# Patient Record
Sex: FEMALE | Race: BLACK OR AFRICAN AMERICAN | Employment: UNEMPLOYED | URBAN - METROPOLITAN AREA
[De-identification: names, ages, dates, MRNs, and addresses within clinical notes are randomized per-mention and may not be internally consistent; named-entity substitution may affect disease eponyms.]

---

## 2019-03-15 ENCOUNTER — OFFICE VISIT (OUTPATIENT)
Dept: ORTHOPEDIC SURGERY | Facility: CLINIC | Age: 44
End: 2019-03-15

## 2019-03-15 VITALS
DIASTOLIC BLOOD PRESSURE: 103 MMHG | BODY MASS INDEX: 51.97 KG/M2 | RESPIRATION RATE: 18 BRPM | OXYGEN SATURATION: 100 % | WEIGHT: 282.4 LBS | HEART RATE: 81 BPM | HEIGHT: 62 IN | TEMPERATURE: 96.7 F | SYSTOLIC BLOOD PRESSURE: 169 MMHG

## 2019-03-15 DIAGNOSIS — M54.50 LUMBAR PAIN: ICD-10-CM

## 2019-03-15 DIAGNOSIS — M54.10 RADICULAR PAIN OF RIGHT LOWER EXTREMITY: ICD-10-CM

## 2019-03-15 DIAGNOSIS — M54.16 LUMBAR RADICULOPATHY: Primary | ICD-10-CM

## 2019-03-15 PROBLEM — E66.01 OBESITY, MORBID (HCC): Status: ACTIVE | Noted: 2019-03-15

## 2019-03-15 RX ORDER — BUPIVACAINE HYDROCHLORIDE 2.5 MG/ML
4 INJECTION, SOLUTION EPIDURAL; INFILTRATION; INTRACAUDAL ONCE
Qty: 4 ML | Refills: 0
Start: 2019-03-15 | End: 2019-03-15

## 2019-03-15 RX ORDER — TRIAMCINOLONE ACETONIDE 40 MG/ML
40 INJECTION, SUSPENSION INTRA-ARTICULAR; INTRAMUSCULAR ONCE
Qty: 0.5 ML | Refills: 0
Start: 2019-03-15 | End: 2019-03-15
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
## 2019-03-18 ENCOUNTER — OFFICE VISIT (OUTPATIENT)
Dept: ORTHOPEDIC SURGERY | Facility: CLINIC | Age: 44
End: 2019-03-18

## 2019-03-18 VITALS
DIASTOLIC BLOOD PRESSURE: 83 MMHG | OXYGEN SATURATION: 97 % | HEIGHT: 62 IN | WEIGHT: 280 LBS | HEART RATE: 81 BPM | RESPIRATION RATE: 16 BRPM | BODY MASS INDEX: 51.53 KG/M2 | SYSTOLIC BLOOD PRESSURE: 127 MMHG

## 2019-03-18 DIAGNOSIS — M54.50 LUMBAR PAIN: ICD-10-CM

## 2019-03-18 DIAGNOSIS — M54.10 RADICULAR PAIN OF RIGHT LOWER EXTREMITY: Primary | ICD-10-CM

## 2019-03-18 DIAGNOSIS — M54.16 LUMBAR RADICULOPATHY: ICD-10-CM

## 2019-03-18 NOTE — LETTER
3/18/2019 4:57 PM 
 
Ms. Meredith [address], [place] [id number] Partial work restrictions for injuries to the Thoracic Spine and Lumbar Spine PATIENT'S NAME: Meera Bernabe   DATE: 3/18/2019 SITTING:   The patient can sit no more than 2 hours at one time without breaks LIFTING:   The patient can lift no more than 25 pounds at a time WALKING/STANDING The patient can walk or stand no more than 2 hours at a time KNEELING/SQUATTING The patient can kneel or squat no more than 30 minutes at a time CLIMBING:   The patient can climb no more than 2 flights of stairs at a     time. The patient cannot climb ladders TWISTING/BENDING: The patient can twist or bend no more than twice an hour OVERHEAD WORK:  The patient can participate in activities requiring overhead use of arms or hands on a intermittent basis only, and no more than 30 minutes at a time. These restrictions are in effect for the above named patient From: 3-20-19  TO:4-22-19 PLEASE EXCUSE HER FROM WORK 3-19-19 Sincerely, 
 
 
Benny Moreno MD
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
## 2019-03-18 NOTE — LETTER
3/18/2019 4:52 PM 
 
Ms. Meredith [address], [address] [address] Partial work restrictions for injuries to the Thoracic Spine and Lumbar Spine PATIENT'S NAME: Drea Moreau   DATE: 3/18/2019 SITTING:   The patient can sit no more than 2 hours at one time without breaks LIFTING:   The patient can lift no more than 25 pounds at a time WALKING/STANDING The patient can walk or stand no more than 2 hours at a time KNEELING/SQUATTING The patient can kneel or squat no more than 30 minutes at a time CLIMBING:   The patient can climb no more than 2 flights of stairs at a     time. The patient cannot climb ladders TWISTING/BENDING: The patient can twist or bend no more than twice an hour OVERHEAD WORK:  The patient can participate in activities requiring overhead use of arms or hands on a intermittent basis only, and no more than 30 minutes at a time. These restrictions are in effect for the above named patient From: 3-27-19  TO:4-22-19 Sincerely, 
 
 
 
Olaf Cook MD

## 2019-04-16 ENCOUNTER — OFFICE VISIT (OUTPATIENT)
Dept: ORTHOPEDIC SURGERY | Age: 44
End: 2019-04-16

## 2019-04-16 VITALS
HEIGHT: 62 IN | RESPIRATION RATE: 17 BRPM | HEART RATE: 86 BPM | OXYGEN SATURATION: 98 % | WEIGHT: 280 LBS | SYSTOLIC BLOOD PRESSURE: 134 MMHG | BODY MASS INDEX: 51.53 KG/M2 | DIASTOLIC BLOOD PRESSURE: 88 MMHG | TEMPERATURE: 97.9 F

## 2019-04-16 DIAGNOSIS — M54.50 LUMBAR PAIN: ICD-10-CM

## 2019-04-16 DIAGNOSIS — M54.16 LUMBAR RADICULOPATHY: Primary | ICD-10-CM

## 2019-04-16 RX ORDER — TOPIRAMATE 25 MG/1
TABLET ORAL
Qty: 90 TAB | Refills: 1 | Status: SHIPPED | OUTPATIENT
Start: 2019-04-16 | End: 2020-11-19 | Stop reason: SDUPTHER

## 2019-04-16 RX ORDER — IBUPROFEN 800 MG/1
TABLET ORAL
COMMUNITY
End: 2019-04-16 | Stop reason: SDUPTHER

## 2019-04-16 RX ORDER — METHOCARBAMOL 500 MG/1
500 TABLET, FILM COATED ORAL
Qty: 60 TAB | Refills: 1 | Status: SHIPPED | OUTPATIENT
Start: 2019-04-16 | End: 2020-11-19 | Stop reason: SDUPTHER

## 2019-04-16 RX ORDER — IBUPROFEN 800 MG/1
800 TABLET ORAL
Qty: 60 TAB | Refills: 1 | Status: SHIPPED | OUTPATIENT
Start: 2019-04-16 | End: 2019-08-07 | Stop reason: SDUPTHER

## 2019-04-16 NOTE — LETTER
NOTIFICATION OF RETURN TO WORK / SCHOOL 
 
4/16/2019 11:04 AM 
 
Ms. [address] [facility] [id number] Wilmer Murphy To Whom It May Concern: 
 
Kristan Zamora is under the care of [facility]. She will be out of work from 4/16-4/17 and Fredy Brace return to work on 4/18. If there are questions or concerns please have the patient contact our office. Sincerely, Daisy Dunn NP

## 2019-04-16 NOTE — PATIENT INSTRUCTIONS
Low Back Arthritis: Exercises  Your Care Instructions  Here are some examples of typical rehabilitation exercises for your condition. Start each exercise slowly. Ease off the exercise if you start to have pain. Your doctor or physical therapist will tell you when you can start these exercises and which ones will work best for you. When you are not being active, find a comfortable position for rest. Some people are comfortable on the floor or a medium-firm bed with a small pillow under their head and another under their knees. Some people prefer to lie on their side with a pillow between their knees. Don't stay in one position for too long. Take short walks (10 to 20 minutes) every 2 to 3 hours. Avoid slopes, hills, and stairs until you feel better. Walk only distances you can manage without pain, especially leg pain. How to do the exercises  Pelvic tilt    1. Lie on your back with your knees bent. 2. \"Brace\" your stomach--tighten your muscles by pulling in and imagining your belly button moving toward your spine. 3. Press your lower back into the floor. You should feel your hips and pelvis rock back. 4. Hold for 6 seconds while breathing smoothly. 5. Relax and allow your pelvis and hips to rock forward. 6. Repeat 8 to 12 times. Back stretches    1. Get down on your hands and knees on the floor. 2. Relax your head and allow it to droop. Round your back up toward the ceiling until you feel a nice stretch in your upper, middle, and lower back. Hold this stretch for as long as it feels comfortable, or about 15 to 30 seconds. 3. Return to the starting position with a flat back while you are on your hands and knees. 4. Let your back sway by pressing your stomach toward the floor. Lift your buttocks toward the ceiling. 5. Hold this position for 15 to 30 seconds. 6. Repeat 2 to 4 times. Follow-up care is a key part of your treatment and safety.  Be sure to make and go to all appointments, and call your doctor if you are having problems. It's also a good idea to know your test results and keep a list of the medicines you take. Where can you learn more? Go to http://renetta-ana maria.info/. Enter A423 in the search box to learn more about \"Low Back Arthritis: Exercises. \"  Current as of: September 20, 2018  Content Version: 11.9  © 9709-1244 [employer]. Care instructions adapted under license by [employer] (which disclaims liability or warranty for this information). If you have questions about a medical condition or this instruction, always ask your healthcare professional. [address] any warranty or liability for your use of this information. Acute Low Back Pain: Exercises  Your Care Instructions  Here are some examples of typical rehabilitation exercises for your condition. Start each exercise slowly. Ease off the exercise if you start to have pain. Your doctor or physical therapist will tell you when you can start these exercises and which ones will work best for you. When you are not being active, find a comfortable position for rest. Some people are comfortable on the floor or a medium-firm bed with a small pillow under their head and another under their knees. Some people prefer to lie on their side with a pillow between their knees. Don't stay in one position for too long. Take short walks (10 to 20 minutes) every 2 to 3 hours. Avoid slopes, hills, and stairs until you feel better. Walk only distances you can manage without pain, especially leg pain. How to do the exercises  Back stretches    7. Get down on your hands and knees on the floor. 8. Relax your head and allow it to droop. Round your back up toward the ceiling until you feel a nice stretch in your upper, middle, and lower back. Hold this stretch for as long as it feels comfortable, or about 15 to 30 seconds.   9. Return to the starting position with a flat back while you are on your hands and knees. 10. Let your back sway by pressing your stomach toward the floor. Lift your buttocks toward the ceiling. 11. Hold this position for 15 to 30 seconds. 12. Repeat 2 to 4 times. Follow-up care is a key part of your treatment and safety. Be sure to make and go to all appointments, and call your doctor if you are having problems. It's also a good idea to know your test results and keep a list of the medicines you take. Where can you learn more? Go to http://renetta-ana maria.info/. Enter N939 in the search box to learn more about \"Acute Low Back Pain: Exercises. \"  Current as of: September 20, 2018  Content Version: 11.9  © 7423-9628 [employer]. Care instructions adapted under license by [employer] (which disclaims liability or warranty for this information). If you have questions about a medical condition or this instruction, always ask your healthcare professional. [address] any warranty or liability for your use of this information.

## 2019-04-16 NOTE — PROGRESS NOTES
[address]  [address], [address]  [place], [address] [place] [address] Street  Phone: [phone]  Fax: [phone]    Lasha Edgaru  : 1975  PCP: No primary care provider on file. NEW PATIENT/   PROGRESS NOTE    HISTORY OF PRESENT ILLNESS:  Chief Complaint   Patient presents with    Back Pain     New Patient    Leg Pain     Oleg Garcia is a 41 y.o.  female with history of lumbar pain. She was seen in March with Dr. Huyen Gomez. On 3/7/19. 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 She had burning in her low back. It got better with sitting. She later went to the mall. She reported a shooting pain into her back and right leg pain. She had such a severe shooting pain in her back that she fell over. She had to hop to her car using her son as a crutch. She has been in pain since that episode. Since then, she states her muscle contracts and her right foot turns in when she walks. She is having a burning pain in her back. When she moves she has right anterior thigh pain. She describes an electrifying pain. She has not taken any days off. She has taken warm bath. She has been using Duxeis. She had a MDP a week after and she developed hives. It did not help. Denies bladder/bowel dysfunction, saddle paresthesia, weakness, gait disturbance, or other neurological deficit. Pt at this time desires to continue with current care/proceed with medication evaluation. RADIOGRAPHS:  XR LUMBAR SPINE 3/15/19 [facility]  IMPRESSION:  Two views - no fractures, mild L3-4 space narrowing, small L3-4 osteophytes present, + spondylolisthesis.     XR RIGHT HIP 19 [facility]  Negative for fracture or dislocation of the right hip.  -I have independently reviewed these images during this office visit. -Dr. Feliz Valiente:  AP pelvis and two views - No fractures, no joint space narrowing, no osteophytes present.  Tonnis grade 0     XR LUMB 10/15/18 [facility]  No acute radiographic abnormality involving the lumbar spine. ASSESSMENT  41 y.o. female with lumbar pain. Diagnoses and all orders for this visit:    1. Lumbar radiculopathy  -     topiramate (TOPAMAX) 25 mg tablet; 1 tab nightly  x 5 days, then 2 tabs nightly x 5 day and then 3 tabs nightly  -     ibuprofen (MOTRIN) 800 mg tablet; Take 1 Tab by mouth two (2) times daily as needed for Pain.  -     methocarbamol (ROBAXIN) 500 mg tablet; Take 1 Tab by mouth two (2) times daily as needed for Pain (spasm). 2. Lumbar pain  -     topiramate (TOPAMAX) 25 mg tablet; 1 tab nightly  x 5 days, then 2 tabs nightly x 5 day and then 3 tabs nightly  -     ibuprofen (MOTRIN) 800 mg tablet; Take 1 Tab by mouth two (2) times daily as needed for Pain.  -     methocarbamol (ROBAXIN) 500 mg tablet; Take 1 Tab by mouth two (2) times daily as needed for Pain (spasm). IMPRESSION/PLAN    1) Pt was given information on lumbar exercises. 2) trial of Topamax, Robaxin, Motrin. 3) Start HEP daily, stretches provided. She already has a referral for Lumbar PT. She will start this soon. 4) Ms. Marianela Coleman has a reminder for a \"due or due soon\" health maintenance. I have asked that she contact her primary care provider, No primary care provider on file. , for follow-up on this health maintenance. 5) We have informed patient to notify us for immediate appointment if he has any worsening neurogical symptoms or if an emergency situation presents, then call 911  6) Pt will follow-up in 6 weeks for med and HEP FU. Will consider MRI after conservative treatment. Risks and benefits of ongoing therapy have been reviewed with the patient.  is appropriate.        PAST MEDICAL HISTORY  Past Medical History:   Diagnosis Date    Diabetes ([address] 3/26/2015    H. pylori infection 3/26/2015    Hypertension 2006        MEDICATIONS  Current Outpatient Medications   Medication Sig Dispense Refill    topiramate (TOPAMAX) 25 mg tablet 1 tab nightly  x 5 days, then 2 tabs nightly x 5 day and then 3 tabs nightly 90 Tab 1    ibuprofen (MOTRIN) 800 mg tablet Take 1 Tab by mouth two (2) times daily as needed for Pain. 60 Tab 1    methocarbamol (ROBAXIN) 500 mg tablet Take 1 Tab by mouth two (2) times daily as needed for Pain (spasm). 60 Tab 1    lisinopril-hydrochlorothiazide (PRINZIDE, ZESTORETIC) 20-25 mg per tablet Take 1 Tab by mouth daily. 30 Tab 3       ALLERGIES  Allergies   Allergen Reactions    Pcn [Penicillins] Anaphylaxis       SOCIAL HISTORY    Social History     Socioeconomic History    Marital status: SINGLE     Spouse name: Not on file    Number of children: Not on file    Years of education: Not on file    Highest education level: Not on file   Occupational History    Occupation: Medical Assistant     Comment: [de-identified] [employer]   Social Needs    Financial resource strain: Not on file    Food insecurity:     Worry: Not on file     Inability: Not on file    Transportation needs:     Medical: Not on file     Non-medical: Not on file   Tobacco Use    Smoking status: Never Smoker    Smokeless tobacco: Never Used   Substance and Sexual Activity    Alcohol use:  Yes     Alcohol/week: 1.5 oz     Types: 3 Standard drinks or equivalent per week     Comment: occasionally, was every weekend stopped in March 2014, 1-2 now per month May 2014    Drug use: No    Sexual activity: Yes     Partners: Male   Lifestyle    Physical activity:     Days per week: Not on file     Minutes per session: Not on file    Stress: Not on file   Relationships    Social connections:     Talks on phone: Not on file     Gets together: Not on file     Attends [employer] service: Not on file     Active member of club or organization: Not on file     Attends meetings of clubs or organizations: Not on file     Relationship status: Not on file    Intimate partner violence:     Fear of current or ex partner: Not on file     Emotionally abused: Not on file     Physically abused: Not on file     Forced sexual activity: Not on file   Other Topics Concern    Not on file   Social History Narrative    Not on file       SUBJECTIVE        Pain Scale: 5/10    Pain Assessment  3/18/2019   Location of Pain Back   Pain Location Comment -   Location Modifiers -   Severity of Pain 10   Quality of Pain Burning; Sharp   Duration of Pain Persistent   Frequency of Pain Constant   Aggravating Factors Standing;Walking   Limiting Behavior Yes   Relieving Factors Nothing   Result of Injury No       Accompanied by self. REVIEW OF SYSTEMS  ROS    Constitutional: Negative for fever, chills, or weight change. Respiratory: Negative for cough or shortness of breath. Cardiovascular: Negative for chest pain or palpitations. Gastrointestinal: Negative for acid reflux, change in bowel habits, or constipation. Genitourinary: Negative for incontinence, dysuria and flank pain. Musculoskeletal: Positive for lumbar pain. Skin: Negative for rash. Neurological: Negative for headaches, dizziness, or numbness. Endo/Heme/Allergies: Negative . Psychiatric/Behavioral: Negative. PHYSICAL EXAMINATION  Visit Vitals  /88   Pulse 86   Temp 97.9 °F (36.6 °C)   Resp 17   Ht 5' 2\" (1.575 m)   Wt 280 lb (127 kg)   SpO2 98%   BMI 51.21 kg/m²       Constitutional: Well developed,  well nourished,  awake, alert, and in no acute distress. Neurological:  Sensation to light touch is intact. Psychiatric: Affect and mood are appropriate. Integumentary: No rashes or abrasions noted on exposed areas,  warm, dry and intact. Cardiovascular/Peripheral Vascular:  No peripheral edema is noted. Lymphatic:  No evidence of lymphedema. No cervical lymphadenopathy. SPINE/MUSCULOSKELETAL EXAM    Lumbar spine:  No rash, ecchymosis, or gross obliquity. No fasciculations. No focal atrophy is noted. Range of motion is intact. Mild Tenderness to palpation to lumbar. SI joints non-tender. Trochanters non tender. Musculoskeletal:  No pain with extension, axial loading, or forward flexion. No pain with internal or external rotation of her hips. MOTOR     Hip Flex  Quads Hamstrings Ankle DF EHL Ankle PF   Right +4/5 +4/5 +4/5 +4/5 +4/5 +4/5   Left +4/5 +4/5 +4/5 +4/5 +4/5 +4/5     Straight Leg raise + R, -L    normal gait and station    Ambulation without assistive device. full weight bearing, non-antalgic gait.     Ceci Shape, NP

## 2019-08-07 ENCOUNTER — OFFICE VISIT (OUTPATIENT)
Dept: ORTHOPEDIC SURGERY | Age: 44
End: 2019-08-07

## 2019-08-07 VITALS
TEMPERATURE: 98 F | HEIGHT: 62 IN | WEIGHT: 262 LBS | BODY MASS INDEX: 48.21 KG/M2 | HEART RATE: 91 BPM | DIASTOLIC BLOOD PRESSURE: 90 MMHG | OXYGEN SATURATION: 98 % | SYSTOLIC BLOOD PRESSURE: 134 MMHG

## 2019-08-07 DIAGNOSIS — M54.16 LUMBAR RADICULOPATHY: Primary | ICD-10-CM

## 2019-08-07 DIAGNOSIS — M54.50 LUMBAR PAIN: ICD-10-CM

## 2019-08-07 DIAGNOSIS — M54.10 RADICULAR PAIN OF RIGHT LOWER EXTREMITY: ICD-10-CM

## 2019-08-07 RX ORDER — AMLODIPINE BESYLATE AND ATORVASTATIN CALCIUM 10; 10 MG/1; MG/1
1 TABLET, FILM COATED ORAL DAILY
COMMUNITY
End: 2021-02-25

## 2019-08-07 RX ORDER — IBUPROFEN 800 MG/1
800 TABLET ORAL
Qty: 60 TAB | Refills: 1 | Status: SHIPPED | OUTPATIENT
Start: 2019-08-07 | End: 2020-11-19 | Stop reason: SDUPTHER

## 2019-08-07 NOTE — PATIENT INSTRUCTIONS
Back Pain: Care Instructions  Your Care Instructions    Back pain has many possible causes. It is often related to problems with muscles and ligaments of the back. It may also be related to problems with the nerves, discs, or bones of the back. Moving, lifting, standing, sitting, or sleeping in an awkward way can strain the back. Sometimes you don't notice the injury until later. Arthritis is another common cause of back pain. Although it may hurt a lot, back pain usually improves on its own within several weeks. Most people recover in 12 weeks or less. Using good home treatment and being careful not to stress your back can help you feel better sooner. Follow-up care is a key part of your treatment and safety. Be sure to make and go to all appointments, and call your doctor if you are having problems. It's also a good idea to know your test results and keep a list of the medicines you take. How can you care for yourself at home? · Sit or lie in positions that are most comfortable and reduce your pain. Try one of these positions when you lie down:  ? Lie on your back with your knees bent and supported by large pillows. ? Lie on the floor with your legs on the seat of a sofa or chair. ? Lie on your side with your knees and hips bent and a pillow between your legs. ? Lie on your stomach if it does not make pain worse. · Do not sit up in bed, and avoid soft couches and twisted positions. Bed rest can help relieve pain at first, but it delays healing. Avoid bed rest after the first day of back pain. · Change positions every 30 minutes. If you must sit for long periods of time, take breaks from sitting. Get up and walk around, or lie in a comfortable position. · Try using a heating pad on a low or medium setting for 15 to 20 minutes every 2 or 3 hours. Try a warm shower in place of one session with the heating pad. · You can also try an ice pack for 10 to 15 minutes every 2 to 3 hours.  Put a thin cloth between the ice pack and your skin. · Take pain medicines exactly as directed. ? If the doctor gave you a prescription medicine for pain, take it as prescribed. ? If you are not taking a prescription pain medicine, ask your doctor if you can take an over-the-counter medicine. · Take short walks several times a day. You can start with 5 to 10 minutes, 3 or 4 times a day, and work up to longer walks. Walk on level surfaces and avoid hills and stairs until your back is better. · Return to work and other activities as soon as you can. Continued rest without activity is usually not good for your back. · To prevent future back pain, do exercises to stretch and strengthen your back and stomach. Learn how to use good posture, safe lifting techniques, and proper body mechanics. When should you call for help? Call your doctor now or seek immediate medical care if:    · You have new or worsening numbness in your legs.     · You have new or worsening weakness in your legs. (This could make it hard to stand up.)     · You lose control of your bladder or bowels.    Watch closely for changes in your health, and be sure to contact your doctor if:    · You have a fever, lose weight, or don't feel well.     · You do not get better as expected. Where can you learn more? Go to http://renetta-ana maria.info/. Enter A165 in the search box to learn more about \"Back Pain: Care Instructions. \"  Current as of: September 20, 2018  Content Version: 12.1  © 7743-8332 [employer]. Care instructions adapted under license by [employer] (which disclaims liability or warranty for this information). If you have questions about a medical condition or this instruction, always ask your healthcare professional. [place] [address] any warranty or liability for your use of this information.

## 2020-11-19 ENCOUNTER — OFFICE VISIT (OUTPATIENT)
Dept: ORTHOPEDIC SURGERY | Age: 45
End: 2020-11-19
Payer: MEDICAID

## 2020-11-19 VITALS
HEART RATE: 81 BPM | TEMPERATURE: 97.8 F | SYSTOLIC BLOOD PRESSURE: 134 MMHG | WEIGHT: 268 LBS | BODY MASS INDEX: 49.02 KG/M2 | RESPIRATION RATE: 15 BRPM | DIASTOLIC BLOOD PRESSURE: 86 MMHG

## 2020-11-19 DIAGNOSIS — M54.50 LUMBAR PAIN: ICD-10-CM

## 2020-11-19 DIAGNOSIS — M54.16 LUMBAR RADICULOPATHY: ICD-10-CM

## 2020-11-19 PROCEDURE — 99213 OFFICE O/P EST LOW 20 MIN: CPT | Performed by: NURSE PRACTITIONER

## 2020-11-19 RX ORDER — IBUPROFEN 800 MG/1
800 TABLET ORAL
Qty: 60 TAB | Refills: 1 | Status: SHIPPED | OUTPATIENT
Start: 2020-11-19

## 2020-11-19 RX ORDER — METFORMIN HYDROCHLORIDE 500 MG/1
500 TABLET ORAL 2 TIMES DAILY WITH MEALS
COMMUNITY
End: 2022-02-10

## 2020-11-19 RX ORDER — METHOCARBAMOL 500 MG/1
500 TABLET, FILM COATED ORAL
Qty: 60 TAB | Refills: 1 | Status: SHIPPED | OUTPATIENT
Start: 2020-11-19 | End: 2022-02-10 | Stop reason: SDUPTHER

## 2020-11-19 RX ORDER — TOPIRAMATE 25 MG/1
TABLET ORAL
Qty: 90 TAB | Refills: 1 | Status: SHIPPED | OUTPATIENT
Start: 2020-11-19 | End: 2021-01-21

## 2020-11-19 NOTE — PATIENT INSTRUCTIONS
Back Stretches: Exercises Introduction Here are some examples of exercises for stretching your back. Start each exercise slowly. Ease off the exercise if you start to have pain. Your doctor or physical therapist will tell you when you can start these exercises and which ones will work best for you. How to do the exercises Overhead stretch 1. Stand comfortably with your feet shoulder-width apart. 2. Looking straight ahead, raise both arms over your head and reach toward the ceiling. Do not allow your head to tilt back. 3. Hold for 15 to 30 seconds, then lower your arms to your sides. 4. Repeat 2 to 4 times. Side stretch 1. Stand comfortably with your feet shoulder-width apart. 2. Raise one arm over your head, and then lean to the other side. 3. Slide your hand down your leg as you let the weight of your arm gently stretch your side muscles. Hold for 15 to 30 seconds. 4. Repeat 2 to 4 times on each side. Press-up 1. Lie on your stomach, supporting your body with your forearms. 2. Press your elbows down into the floor to raise your upper back. As you do this, relax your stomach muscles and allow your back to arch without using your back muscles. As your press up, do not let your hips or pelvis come off the floor. 3. Hold for 15 to 30 seconds, then relax. 4. Repeat 2 to 4 times. Relax and rest  
1. Lie on your back with a rolled towel under your neck and a pillow under your knees. Extend your arms comfortably to your sides. 2. Relax and breathe normally. 3. Remain in this position for about 10 minutes. 4. If you can, do this 2 or 3 times each day. Follow-up care is a key part of your treatment and safety. Be sure to make and go to all appointments, and call your doctor if you are having problems. It's also a good idea to know your test results and keep a list of the medicines you take. Where can you learn more? Go to http://www.[employer].com/ Enter Y766 in the search box to learn more about \"Back Stretches: Exercises. \" Current as of: March 2, 2020               Content Version: 12.6 © 2006-2020 [employer]. Care instructions adapted under license by [employer] (which disclaims liability or warranty for this information). If you have questions about a medical condition or this instruction, always ask your healthcare professional. [address] any warranty or liability for your use of this information. Low Back Pain: Exercises Introduction Here are some examples of exercises for you to try. The exercises may be suggested for a condition or for rehabilitation. Start each exercise slowly. Ease off the exercises if you start to have pain. You will be told when to start these exercises and which ones will work best for you. How to do the exercises Press-up 1. Lie on your stomach, supporting your body with your forearms. 2. Press your elbows down into the floor to raise your upper back. As you do this, relax your stomach muscles and allow your back to arch without using your back muscles. As your press up, do not let your hips or pelvis come off the floor. 3. Hold for 15 to 30 seconds, then relax. 4. Repeat 2 to 4 times. Alternate arm and leg (bird dog) exercise Do this exercise slowly. Try to keep your body straight at all times, and do not let one hip drop lower than the other. 1. Start on the floor, on your hands and knees. 2. Tighten your belly muscles. 3. Raise one leg off the floor, and hold it straight out behind you. Be careful not to let your hip drop down, because that will twist your trunk. 4. Hold for about 6 seconds, then lower your leg and switch to the other leg. 5. Repeat 8 to 12 times on each leg. 6. Over time, work up to holding for 10 to 30 seconds each time.  
7. If you feel stable and secure with your leg raised, try raising the opposite arm straight out in front of you at the same time. Knee-to-chest exercise 1. Lie on your back with your knees bent and your feet flat on the floor. 2. Bring one knee to your chest, keeping the other foot flat on the floor (or keeping the other leg straight, whichever feels better on your lower back). 3. Keep your lower back pressed to the floor. Hold for at least 15 to 30 seconds. 4. Relax, and lower the knee to the starting position. 5. Repeat with the other leg. Repeat 2 to 4 times with each leg. 6. To get more stretch, put your other leg flat on the floor while pulling your knee to your chest.   
Curl-ups 1. Lie on the floor on your back with your knees bent at a 90-degree angle. Your feet should be flat on the floor, about 12 inches from your buttocks. 2. Cross your arms over your chest. If this bothers your neck, try putting your hands behind your neck (not your head), with your elbows spread apart. 3. Slowly tighten your belly muscles and raise your shoulder blades off the floor. 4. Keep your head in line with your body, and do not press your chin to your chest. 
5. Hold this position for 1 or 2 seconds, then slowly lower yourself back down to the floor. 6. Repeat 8 to 12 times. Pelvic tilt exercise 1. Lie on your back with your knees bent. 2. \"Brace\" your stomach. This means to tighten your muscles by pulling in and imagining your belly button moving toward your spine. You should feel like your back is pressing to the floor and your hips and pelvis are rocking back. 3. Hold for about 6 seconds while you breathe smoothly. 4. Repeat 8 to 12 times. Heel dig bridging 1. Lie on your back with both knees bent and your ankles bent so that only your heels are digging into the floor. Your knees should be bent about 90 degrees.  
2. Then push your heels into the floor, squeeze your buttocks, and lift your hips off the floor until your shoulders, hips, and knees are all in a straight line. 3. Hold for about 6 seconds as you continue to breathe normally, and then slowly lower your hips back down to the floor and rest for up to 10 seconds. 4. Do 8 to 12 repetitions. Hamstring stretch in doorway 1. Lie on your back in a doorway, with one leg through the open door. 2. Slide your leg up the wall to straighten your knee. You should feel a gentle stretch down the back of your leg. 3. Hold the stretch for at least 15 to 30 seconds. Do not arch your back, point your toes, or bend either knee. Keep one heel touching the floor and the other heel touching the wall. 4. Repeat with your other leg. 5. Do 2 to 4 times for each leg. Hip flexor stretch 1. Kneel on the floor with one knee bent and one leg behind you. Place your forward knee over your foot. Keep your other knee touching the floor. 2. Slowly push your hips forward until you feel a stretch in the upper thigh of your rear leg. 3. Hold the stretch for at least 15 to 30 seconds. Repeat with your other leg. 4. Do 2 to 4 times on each side. Wall sit 1. Stand with your back 10 to 12 inches away from a wall. 2. Lean into the wall until your back is flat against it. 3. Slowly slide down until your knees are slightly bent, pressing your lower back into the wall. 4. Hold for about 6 seconds, then slide back up the wall. 5. Repeat 8 to 12 times. Follow-up care is a key part of your treatment and safety. Be sure to make and go to all appointments, and call your doctor if you are having problems. It's also a good idea to know your test results and keep a list of the medicines you take. Where can you learn more? Go to http://www.[employer].com/ Enter Z324 in the search box to learn more about \"Low Back Pain: Exercises. \" Current as of: March 2, 2020               Content Version: 12.6 © 6664-7831 [employer].   
Care instructions adapted under license by [employer] (which disclaims liability or warranty for this information). If you have questions about a medical condition or this instruction, always ask your healthcare professional. [address] any warranty or liability for your use of this information.

## 2020-11-19 NOTE — PROGRESS NOTES
[address]  [address], [address]  [facility], [address]  Phone: [phone]  Fax: [phone]    Debbi Mclaughlin  : 1975  PCP: Eleanor Morgan MD    PROGRESS NOTE    HISTORY OF PRESENT ILLNESS:  Chief Complaint   Patient presents with    Back Pain     fu    Atilio Wall is a 43 y.o.  female with history of lumbar pain.  She was seenwith Dr. Kathia Gillis in the past. I saw her over a year ago. 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 She had burning in her low back. It got better with sitting. She later went to the mall. Mindy Miller reported a shooting pain into her back and right leg pain. She had such a severe shooting pain in her back that she fell over.  She had to hop to her car using her son as a crutch. She has been in pain since that episode.   I updated her LMRI, she was to continue Topamax and robaxin. She was also referred to PT. Today, she states she never had the MRI for insurance reasons. She is having the same issues. She is some better as she is not working but she is taking care of her mom. She has to lift and move her. She has low back pain and right anterior thigh pain. She has pain with walking and standing and the most pain when she is bending. She has taken Topamax, Robaxin, motrin and doing a HEP since I last saw her. She would like to update her MRI as she is interested in a spinal injections.  Denies bladder/bowel dysfunction, saddle paresthesia, weakness, gait disturbance, or other neurological deficit.  Pt at this time desires to continue with current care/proceed with medication evaluation.      RADIOGRAPHS:  XR LUMBAR SPINE 3/15/19 [facility]  IMPRESSION:  Two views - no fractures, mild L3-4 space narrowing, small L3-4 osteophytes present, + spondylolisthesis.     XR RIGHT HIP 19 [facility]  Negative for fracture or dislocation of the right hip.  -I have independently reviewed these images during this office visit. -Dr. Pernell Sesay:  AP pelvis and two views - No fractures, no joint space narrowing, no osteophytes present. Tonnis grade 0     XR LUMB 10/15/18 [facility]  No acute radiographic abnormality involving the lumbar spine.       ASSESSMENT  39 y.o. female with back pain. Diagnoses and all orders for this visit:    1. Lumbar radiculopathy  -     methocarbamoL (Robaxin) 500 mg tablet; Take 1 Tab by mouth two (2) times daily as needed for Pain (spasm). -     MRI LUMB SPINE WO CONT; Future  -     topiramate (Topamax) 25 mg tablet; 1 tab nightly  x 5 days, then 2 tabs nightly x 5 day and then 3 tabs nightly  -     ibuprofen (MOTRIN) 800 mg tablet; Take 1 Tab by mouth two (2) times daily as needed for Pain. 2. Lumbar pain  -     methocarbamoL (Robaxin) 500 mg tablet; Take 1 Tab by mouth two (2) times daily as needed for Pain (spasm). -     MRI LUMB SPINE WO CONT; Future  -     topiramate (Topamax) 25 mg tablet; 1 tab nightly  x 5 days, then 2 tabs nightly x 5 day and then 3 tabs nightly  -     ibuprofen (MOTRIN) 800 mg tablet; Take 1 Tab by mouth two (2) times daily as needed for Pain. IMPRESSION/PLAN    1) Pt was given information on back exercises. 2) Update LMRI, has completed conservative treatment. 3) refill Robaxin, topamax, motrin   4) Ms. Mirian Augustin has a reminder for a \"due or due soon\" health maintenance. I have asked that she contact her primary care provider, Diana Morse MD, for follow-up on this health maintenance. 5) We have informed patient to notify us for immediate appointment if he has any worsening neurogical symptoms or if an emergency situation presents, then call 911  6) Pt will follow-up in 4 weeks for MRI FU. Risks and benefits of ongoing therapy have been reviewed with the patient.  is appropriate. No pain behaviors. Denies thoughts of harming self or others.  Pt has a good risk to benefit ratio which allows the pt to function in a home environment without side effects. PAST MEDICAL HISTORY  Past Medical History:   Diagnosis Date    Diabetes ([address] 3/26/2015    H. pylori infection 3/26/2015    Hypertension 2006        MEDICATIONS  Current Outpatient Medications   Medication Sig Dispense Refill    metFORMIN (GLUCOPHAGE) 500 mg tablet Take  by mouth two (2) times daily (with meals).  methocarbamoL (Robaxin) 500 mg tablet Take 1 Tab by mouth two (2) times daily as needed for Pain (spasm). 60 Tab 1    topiramate (Topamax) 25 mg tablet 1 tab nightly  x 5 days, then 2 tabs nightly x 5 day and then 3 tabs nightly 90 Tab 1    ibuprofen (MOTRIN) 800 mg tablet Take 1 Tab by mouth two (2) times daily as needed for Pain. 60 Tab 1    amLODIPine-atorvastatin (CADUET) 10-10 mg per tablet Take 1 Tab by mouth daily.  lisinopril-hydrochlorothiazide (PRINZIDE, ZESTORETIC) 20-25 mg per tablet Take 1 Tab by mouth daily. 30 Tab 3       ALLERGIES  Allergies   Allergen Reactions    Pcn [Penicillins] Anaphylaxis       SOCIAL HISTORY    Social History     Socioeconomic History    Marital status: SINGLE     Spouse name: Not on file    Number of children: Not on file    Years of education: Not on file    Highest education level: Not on file   Occupational History    Occupation: Medical Assistant     Comment: [id number]   Social Needs    Financial resource strain: Not on file    Food insecurity     Worry: Not on file     Inability: Not on file   [employer] needs     Medical: Not on file     Non-medical: Not on file   Tobacco Use    Smoking status: Never Smoker    Smokeless tobacco: Never Used   Substance and Sexual Activity    Alcohol use:  Yes     Alcohol/week: 2.5 standard drinks     Types: 3 Standard drinks or equivalent per week     Comment: occasionally, was every weekend stopped in March 2014, 1-2 now per month May 2014    Drug use: No    Sexual activity: Yes     Partners: Male   Lifestyle    Physical activity     Days per week: Not on file     Minutes per session: Not on file    Stress: Not on file   Relationships    Social connections     Talks on phone: Not on file     Gets together: Not on file     Attends Bahai service: Not on file     Active member of club or organization: Not on file     Attends meetings of clubs or organizations: Not on file     Relationship status: Not on file    Intimate partner violence     Fear of current or ex partner: Not on file     Emotionally abused: Not on file     Physically abused: Not on file     Forced sexual activity: Not on file   Other Topics Concern    Not on file   Social History Narrative    Not on file       SUBJECTIVE      Pain Scale: 4/10    Pain Assessment  11/19/2020   Location of Pain Back   Pain Location Comment -   Location Modifiers -   Severity of Pain 4   Quality of Pain Dull   Quality of Pain Comment -   Duration of Pain Persistent   Duration of Pain Comment -   Frequency of Pain Constant   Aggravating Factors Standing;Walking   Limiting Behavior Some   Relieving Factors Rest   Result of Injury -       Accompanied by self. REVIEW OF SYSTEMS  ROS    Constitutional: Negative for fever, chills, or weight change. Respiratory: Negative for cough or shortness of breath. Cardiovascular: Negative for chest pain or palpitations. Gastrointestinal: Negative for acid reflux, change in bowel habits, or constipation. Genitourinary: Negative for incontinence, dysuria and flank pain. Musculoskeletal: Positive for back pain. Skin: Negative for rash. Neurological: Negative for headaches, dizziness, or numbness. Endo/Heme/Allergies: Negative . Psychiatric/Behavioral: Negative. PHYSICAL EXAMINATION  Visit Vitals  /86   Pulse 81   Temp 97.8 °F (36.6 °C) (Temporal)   Resp 15   Wt 268 lb (121.6 kg)   BMI 49.02 kg/m²       Constitutional: Well developed,  well nourished,  awake, alert, and in no acute distress.   Neurological: Sensation to light touch is intact. Psychiatric: Affect and mood are appropriate. Integumentary: No rashes or abrasions noted on exposed areas,  warm, dry and intact. Cardiovascular/Peripheral Vascular:  No peripheral edema is noted. Lymphatic:  No evidence of lymphedema. No cervical lymphadenopathy. SPINE/MUSCULOSKELETAL EXAM  Lumbar spine:  No rash, ecchymosis, or gross obliquity. No fasciculations. No focal atrophy is noted. Range of motion is intact. Tenderness to palpation to low back. SI joints non-tender. Trochanters non tender. Musculoskeletal:  No pain with extension, axial loading, or forward flexion. No pain with internal or external rotation of her hips. MOTOR     Hip Flex  Quads Hamstrings Ankle DF EHL Ankle PF   Right +4/5 +4/5 +4/5 +4/5 +4/5 +4/5   Left +4/5 +4/5 +4/5 +4/5 +4/5 +4/5     Straight Leg raise  - bilaterally. normal gait and station    Ambulation without assistive device. full weight bearing, non-antalgic gait.     Xenia Rign NP

## 2020-12-07 ENCOUNTER — HOSPITAL ENCOUNTER (OUTPATIENT)
Age: 45
Discharge: HOME | End: 2020-12-07
Attending: NURSE PRACTITIONER
Payer: COMMERCIAL

## 2020-12-07 PROCEDURE — 72148 MRI LUMBAR SPINE W/O DYE: CPT

## 2020-12-29 ENCOUNTER — HOSPITAL ENCOUNTER (OUTPATIENT)
Dept: LAB | Age: 45
Discharge: HOME | End: 2020-12-29
Payer: COMMERCIAL

## 2020-12-29 LAB
25(OH)D3 SERPL-MCNC: 24 NG/ML (ref 30–100)
ALBUMIN SERPL-MCNC: 3.8 G/DL (ref 3.4–5)
ALBUMIN/GLOB SERPL: 1 {RATIO} (ref 0.8–1.7)
ALP SERPL-CCNC: 110 U/L (ref 45–117)
ALT SERPL-CCNC: 51 U/L (ref 13–56)
ANION GAP SERPL CALC-SCNC: 4 MMOL/L (ref 3–18)
AST SERPL-CCNC: 22 U/L (ref 10–38)
BASOPHILS # BLD: 0 K/UL (ref 0–0.1)
BASOPHILS NFR BLD: 0 % (ref 0–2)
BILIRUB SERPL-MCNC: 0.3 MG/DL (ref 0.2–1)
BUN SERPL-MCNC: 14 MG/DL (ref 7–18)
BUN/CREAT SERPL: 19 (ref 12–20)
CALCIUM SERPL-MCNC: 9.2 MG/DL (ref 8.5–10.1)
CHLORIDE SERPL-SCNC: 104 MMOL/L (ref 100–111)
CHOLEST SERPL-MCNC: 216 MG/DL
CO2 SERPL-SCNC: 30 MMOL/L (ref 21–32)
CREAT SERPL-MCNC: 0.75 MG/DL (ref 0.6–1.3)
CREAT UR-MCNC: 308 MG/DL (ref 30–125)
DIFFERENTIAL METHOD BLD: NORMAL
EOSINOPHIL # BLD: 0.2 K/UL (ref 0–0.4)
EOSINOPHIL NFR BLD: 2 % (ref 0–5)
ERYTHROCYTE [DISTWIDTH] IN BLOOD BY AUTOMATED COUNT: 13.4 % (ref 11.6–14.5)
GLOBULIN SER CALC-MCNC: 3.7 G/DL (ref 2–4)
GLUCOSE SERPL-MCNC: 175 MG/DL (ref 74–99)
HBA1C MFR BLD: 8 % (ref 4.2–5.6)
HCT VFR BLD AUTO: 38.8 % (ref 35–45)
HDLC SERPL-MCNC: 57 MG/DL (ref 40–60)
HDLC SERPL: 3.8 {RATIO} (ref 0–5)
HGB BLD-MCNC: 12.4 G/DL (ref 12–16)
LDLC SERPL CALC-MCNC: 146 MG/DL (ref 0–100)
LIPID PROFILE,FLP: ABNORMAL
LYMPHOCYTES # BLD: 2.5 K/UL (ref 0.9–3.6)
LYMPHOCYTES NFR BLD: 32 % (ref 21–52)
MCH RBC QN AUTO: 26.6 PG (ref 24–34)
MCHC RBC AUTO-ENTMCNC: 32 G/DL (ref 31–37)
MCV RBC AUTO: 83.1 FL (ref 74–97)
MICROALBUMIN UR-MCNC: 2.36 MG/DL (ref 0–3)
MICROALBUMIN/CREAT UR-RTO: 8 MG/G (ref 0–30)
MONOCYTES # BLD: 0.4 K/UL (ref 0.05–1.2)
MONOCYTES NFR BLD: 5 % (ref 3–10)
NEUTS SEG # BLD: 4.8 K/UL (ref 1.8–8)
NEUTS SEG NFR BLD: 61 % (ref 40–73)
PLATELET # BLD AUTO: 294 K/UL (ref 135–420)
PMV BLD AUTO: 11.2 FL (ref 9.2–11.8)
POTASSIUM SERPL-SCNC: 4.4 MMOL/L (ref 3.5–5.5)
PROT SERPL-MCNC: 7.5 G/DL (ref 6.4–8.2)
RBC # BLD AUTO: 4.67 M/UL (ref 4.2–5.3)
SODIUM SERPL-SCNC: 138 MMOL/L (ref 136–145)
T4 SERPL-MCNC: 7.2 UG/DL (ref 4.8–13.9)
TRIGL SERPL-MCNC: 65 MG/DL (ref ?–150)
TSH SERPL DL<=0.05 MIU/L-ACNC: 1.06 UIU/ML (ref 0.36–3.74)
VLDLC SERPL CALC-MCNC: 13 MG/DL
WBC # BLD AUTO: 7.9 K/UL (ref 4.6–13.2)

## 2020-12-29 PROCEDURE — 82306 VITAMIN D 25 HYDROXY: CPT

## 2020-12-29 PROCEDURE — 80061 LIPID PANEL: CPT

## 2020-12-29 PROCEDURE — 82043 UR ALBUMIN QUANTITATIVE: CPT

## 2020-12-29 PROCEDURE — 84436 ASSAY OF TOTAL THYROXINE: CPT

## 2020-12-29 PROCEDURE — 87522 HEPATITIS C REVRS TRNSCRPJ: CPT

## 2020-12-29 PROCEDURE — 36415 COLL VENOUS BLD VENIPUNCTURE: CPT

## 2020-12-29 PROCEDURE — 85025 COMPLETE CBC W/AUTO DIFF WBC: CPT

## 2020-12-29 PROCEDURE — 80053 COMPREHEN METABOLIC PANEL: CPT

## 2020-12-29 PROCEDURE — 83036 HEMOGLOBIN GLYCOSYLATED A1C: CPT

## 2020-12-29 PROCEDURE — 87389 HIV-1 AG W/HIV-1&-2 AB AG IA: CPT

## 2020-12-29 PROCEDURE — 84443 ASSAY THYROID STIM HORMONE: CPT

## 2020-12-30 LAB
HCV GENOTYPE: NORMAL
HCV RNA SERPL NAA+PROBE-ACNC: NORMAL IU/ML
HCV RNA SERPL NAA+PROBE-LOG IU: NORMAL LOG10 IU/ML
HIV 1+2 AB+HIV1 P24 AG SERPL QL IA: NONREACTIVE
HIV12 RESULT COMMENT, HHIVC: NORMAL
TEST INFORMATION, 550045: NORMAL

## 2021-01-21 ENCOUNTER — OFFICE VISIT (OUTPATIENT)
Dept: ORTHOPEDIC SURGERY | Age: 46
End: 2021-01-21
Payer: COMMERCIAL

## 2021-01-21 VITALS
TEMPERATURE: 97.9 F | BODY MASS INDEX: 49.02 KG/M2 | HEART RATE: 81 BPM | SYSTOLIC BLOOD PRESSURE: 142 MMHG | DIASTOLIC BLOOD PRESSURE: 88 MMHG | WEIGHT: 268 LBS

## 2021-01-21 DIAGNOSIS — M54.16 LUMBAR NEURITIS: Primary | ICD-10-CM

## 2021-01-21 DIAGNOSIS — M47.816 SPONDYLOSIS OF LUMBAR REGION WITHOUT MYELOPATHY OR RADICULOPATHY: ICD-10-CM

## 2021-01-21 PROCEDURE — 99213 OFFICE O/P EST LOW 20 MIN: CPT | Performed by: PHYSICAL MEDICINE & REHABILITATION

## 2021-01-21 RX ORDER — TOPIRAMATE 50 MG/1
50 TABLET, FILM COATED ORAL
Qty: 30 TAB | Refills: 2 | Status: SHIPPED | OUTPATIENT
Start: 2021-01-21 | End: 2022-02-10 | Stop reason: SDUPTHER

## 2021-01-21 NOTE — PROGRESS NOTES
Thea Almeida [facility] [address].  [address], [address]  [facility], [address]  Phone: [phone]  Fax: [phone]        Adrián Antunez  : 1975  PCP: Katarina Pantoja MD    PROGRESS NOTE      ASSESSMENT AND PLAN    Diagnoses and all orders for this visit:    1. Lumbar neuritis  -     topiramate (Topamax) 50 mg tablet; Take 1 Tab by mouth nightly.  -     REFERRAL TO PHYSICAL THERAPY    2. Spondylosis of lumbar region without myelopathy or radiculopathy      1. 39 y.o. female with medical low back pain and neuritis without radiculopathy. Her MRI findings are reassuring. 2. Advised to continue HEP  3. Referred to PT  4. Continue Topamax 50 mg QHS  5. Continue Ibuprofen PRN  6. Given information on lumbar stenosis    Follow-up and Dispositions    · Return in 1 month (on 2021). HISTORY OF PRESENT ILLNESS  Atilio Boothe is a 39 y.o. female. Pt was last evaluated 2019 by TJ Frost for lumbar radiuclopathy. This is my first evaluation of the patient. Pt had a L MRI, images reviewed. Pt notes that she hasn't been working since 2020. Reports that now her low back pain comes from days where she stands/walks alot, like walking the dog. Describes pain in the RLE and behind the R knee at night, citing that she felt throbbing over the past 4 days. She expresses concern over it being DVT. Affirms insomnia due to pain. Denies experiencing this pain before. Denies any injury to the R knee. Denies pain in the LLE. Mentions that the leg will give out randomly. States that when the pain first began, there were 3 months where she couldn't walk on the RLE, noting that she had to use cane/crutches.      Pain Assessment  2021   Location of Pain Back;Leg   Pain Location Comment -   Location Modifiers Right   Severity of Pain 0   Quality of Pain (No Data)   Quality of Pain Comment numbness tingling   Duration of Pain -   Duration of Pain Comment -   Frequency of Pain Intermittent   Aggravating Factors Standing   Aggravating Factors Comment laying, night time   Limiting Behavior Some   Relieving Factors Nothing   Result of Injury -       Does pain radiate into extremities: RLE  Does patient have numbness/tingling: No  Does patient have weakness: No   Pt denies saddle paresthesias. Denies persistent fevers, chills, weight changes, neurogenic bowel or bladder symptoms. Treatments patient has tried:  Physical therapy:No  Doing HEP: Unknown  Beneficial medications: Topamax 25 mg 3 tab nightly. Robaxin 500 mg. Ibuprofen. Failed medications: Unknown  Spinal injections: No    Spinal surgery- No.   Spine surgery consult: No.     L MRI 11/2020: disc protrusion on the L at L5-S1, facet changes  L3-4 w/relative stenosis     reviewed. PMHx of DM, HTN, H. pylori. 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 MRI Results (most recent):  Results from Orders Only encounter on 11/19/20   MRI LUMB SPINE WO CONT    Narrative PROCEDURE: MRI OF THE LUMBAR SPINE WITHOUT CONTRAST. CPT CODE: 95314    PROVIDED REASON FOR EXAM:  Lumbar radiculopathy. Lumbar pain. According to the  available office notes patient has RIGHT SIDE radiculopathy, RIGHT anterior  thigh pain. COMPARISONS: No prior lumbar spine MRI available for comparison. Report for  lumbar spine radiographs available in Care Everywhere from 10/15/2018. TECHNIQUE: T1 weighted, T2 FSE, and FSE inversion recovery sagittal images are  supplemented by T2 weighted and T1 weighted axial images. FINDINGS:    Alignment is normal.  Vertebral body heights are normal.  No acute or chronic fractures. Overall the disc heights are maintained. Disc desiccative changes at L3/L4. Marrow signal is not pathologic. Known marrow edema or mass. Conus terminates at the L1/2 level with normal signal.    Correlation of axial and sagittal data through the disc levels:     On sagittal images only there is a minimal disc bulge at T11/T12 without central  canal or foraminal stenosis. The T12/L1 disc level appears normal.    L1/2: Disc And Central Canal: No significant disc pathology or central stenosis. Facets: No significant arthropathy. Right Foramen: No stenosis. Left Foramen: No stenosis. L2/3: Disc And Central Canal: No significant disc pathology or central stenosis. Facets: No significant arthropathy. Right Foramen: No stenosis. Left Foramen: No stenosis. L3/4: Disc And Central Canal: Small focal posterior disc protrusion with  indentation of the ventral epidural fat and slight contact with the CSF space. Minimal circumferential encroachment on the central canal, may be mild central  canal narrowing. No nerve root impingement. Facets: Minimal bilateral facet DJD              Right Foramen: No stenosis. Left Foramen: No stenosis. L4/5:  Disc And Central Canal: No significant disc pathology or central  stenosis. Facets: No significant arthropathy. Right Foramen: No stenosis. Left Foramen: No stenosis. L5/S1: Disc And Central Canal: Posterior central disc protrusion superimposed on  mild diffuse disc bulge. Indentation of the epidural fat. No central spinal  canal stenosis or nerve root impingement. Facets: Mild facet DJD              Right Foramen: No stenosis. Left Foramen: Disc bulge combines with facet spurs to cause focal  moderate to marked stenosis abdomen level of the pedicle. (Reference sagittal  T1, sagittal T2 image 4). Visible Retroperitoneum And Abdomen: Unremarkable. Impression IMPRESSION:    1. L3/L4 small posterior central disc protrusion with indentation of the CSF  space, no focal nerve root impingement. Mild central canal narrowing. Lateral  recess encroachment without nerve root impingement.     2. L5/S1 posterior central disc protrusion and disc bulge without central canal  stenosis or nerve root impingement. There is focal narrowing of the left foramen  at the level of the pedicle secondary to disc bulge and facet spurs. PAST MEDICAL HISTORY   Past Medical History:   Diagnosis Date    Diabetes ([address] 3/26/2015    H. pylori infection 3/26/2015    Hypertension 2006        Past Surgical History:   Procedure Laterality Date    HX GYN          HX TUBAL LIGATION           MEDICATIONS      Current Outpatient Medications   Medication Sig Dispense Refill    topiramate (Topamax) 50 mg tablet Take 1 Tab by mouth nightly. 30 Tab 2    metFORMIN (GLUCOPHAGE) 500 mg tablet Take  by mouth two (2) times daily (with meals).  methocarbamoL (Robaxin) 500 mg tablet Take 1 Tab by mouth two (2) times daily as needed for Pain (spasm). 60 Tab 1    ibuprofen (MOTRIN) 800 mg tablet Take 1 Tab by mouth two (2) times daily as needed for Pain. 60 Tab 1    amLODIPine-atorvastatin (CADUET) 10-10 mg per tablet Take 1 Tab by mouth daily.  lisinopril-hydrochlorothiazide (PRINZIDE, ZESTORETIC) 20-25 mg per tablet Take 1 Tab by mouth daily. 30 Tab 3       Controlled Substance Monitoring:    No flowsheet data found. ALLERGIES    Allergies   Allergen Reactions    Pcn [Penicillins] Anaphylaxis          PHYSICAL EXAMINATION  Visit Vitals  BP (!) 142/88 (BP 1 Location: Left arm, BP Patient Position: Sitting)   Pulse 81   Temp 97.9 °F (36.6 °C) (Temporal)   Wt 268 lb (121.6 kg)   BMI 49.02 kg/m²         Accompanied by self. Constitutional:  Well developed, well nourished, in no acute distress. Psychiatric: Affect and mood are appropriate. Integumentary: No rashes or abrasions noted on exposed areas. Cardiovascular/Peripheral Vascular: No peripheral edema is noted BLE. SPINE/MUSCULOSKELETAL EXAM    Lumbar spine:  No rash, ecchymosis, or gross obliquity. No fasciculations. No focal atrophy is noted. Fingertips to shins.  Good ROM, no DP. Tenderness to palpation L4-5 to the sacrum, B/L sciatic notch, R trochanteric bursa. SI joints non-tender. MOTOR:       Hip Flex  Quads Hamstrings Ankle DF EHL Ankle PF   Right +4/5 +4/5 +4/5 +4/5 +4/5 +4/5   Left +4/5 +4/5 +4/5 +4/5 +4/5 +4/5     Straight Leg raise negative. No difficulty with tandem gait. Intact heel walk. Thigh pain with R hip ROM    Ambulation without assistive device. FWB. Written by Libra Villa, as dictated by Ayana Delaney MD.    I, Dr. Ayana Delaney MD, confirm that all documentation is accurate. Ms. Posadas Presumerasmo may have a reminder for a \"due or due soon\" health maintenance. I have asked that she contact her primary care provider for follow-up on this health maintenance.

## 2021-01-21 NOTE — PATIENT INSTRUCTIONS
Lumbar Spinal Stenosis: Care Instructions Your Care Instructions Stenosis in the spine is a narrowing of the canal that is around the spinal cord and nerve roots in your back. It can happen as part of aging. Sometimes bone and other tissue grow into this canal and press on the nerves that branch out from the spinal cord. This can cause pain, numbness, and weakness. When it happens in the lower part of your back, it is called lumbar spinal stenosis. It can cause problems in the legs, feet, and rear end (buttocks). You may be able to get relief from the symptoms of spinal stenosis by taking pain medicine. Your doctor may suggest physical therapy and exercises to keep your spine strong and flexible. Some people try steroid shots to reduce swelling. If pain and numbness in your legs are still so bad that you cannot do your normal activities, you may need surgery. Follow-up care is a key part of your treatment and safety. Be sure to make and go to all appointments, and call your doctor if you are having problems. It's also a good idea to know your test results and keep a list of the medicines you take. How can you care for yourself at home? · Take an over-the-counter pain medicine. Nonsteroidal anti-inflammatory drugs (NSAIDs) such as ibuprofen or naproxen seem to work best. But if you can't take NSAIDs, you can try acetaminophen. Be safe with medicines. Read and follow all instructions on the label. · Do not take two or more pain medicines at the same time unless the doctor told you to. Many pain medicines have acetaminophen, which is Tylenol. Too much acetaminophen (Tylenol) can be harmful. · Stay at a healthy weight. Being overweight puts extra strain on your spine. · Change positions often when you sit or stand. This can ease pain. It may also reduce pressure on the spinal cord and its nerves. · Avoid doing things that make your symptoms worse. Walking downhill and standing for a long time may cause pain. · Stretch and strengthen your back muscles as your doctor or physical therapist recommends. If your doctor says it is okay to do them, these exercises may help. ? Lie on your back with your knees bent. Gently pull one bent knee to your chest. Put that foot back on the floor, and then pull the other knee to your chest. 
? Do pelvic tilts. Lie on your back with your knees bent. Tighten your stomach muscles. Pull your belly button (navel) in and up toward your ribs. You should feel like your back is pressing to the floor and your hips and pelvis are slightly lifting off the floor. Hold for 6 seconds while breathing smoothly. ? Stand with your back flat against a wall. Slowly slide down until your knees are slightly bent. Hold for 10 seconds, then slide back up the wall. · Remove or change anything in your house that may cause you to fall. Keep walkways clear of clutter, electrical cords, and throw rugs. When should you call for help? Call 911 anytime you think you may need emergency care. For example, call if: 
  · You are unable to move a leg at all. Call your doctor now or seek immediate medical care if: 
  · You have new or worse symptoms in your legs, belly, or buttocks. Symptoms may include: 
? Numbness or tingling. ? Weakness. ? Pain.  
  · You lose bladder or bowel control. Watch closely for changes in your health, and be sure to contact your doctor if: 
  · You have a fever, lose weight, or don't feel well.  
  · You are not getting better as expected. Where can you learn more? Go to http://www.[employer]/ Alex Pavon in the search box to learn more about \"Lumbar Spinal Stenosis: Care Instructions. \" Current as of: March 2, 2020               Content Version: 12.6 © 7323-5990 [employer]. Care instructions adapted under license by [employer] (which disclaims liability or warranty for this information). If you have questions about a medical condition or this instruction, always ask your healthcare professional. [address] any warranty or liability for your use of this information.

## 2021-02-25 ENCOUNTER — OFFICE VISIT (OUTPATIENT)
Dept: ORTHOPEDIC SURGERY | Age: 46
End: 2021-02-25
Payer: COMMERCIAL

## 2021-02-25 VITALS
DIASTOLIC BLOOD PRESSURE: 96 MMHG | TEMPERATURE: 97.7 F | HEIGHT: 62 IN | OXYGEN SATURATION: 94 % | BODY MASS INDEX: 48.95 KG/M2 | HEART RATE: 102 BPM | WEIGHT: 266 LBS | SYSTOLIC BLOOD PRESSURE: 150 MMHG

## 2021-02-25 DIAGNOSIS — M47.816 SPONDYLOSIS OF LUMBAR REGION WITHOUT MYELOPATHY OR RADICULOPATHY: Primary | ICD-10-CM

## 2021-02-25 PROCEDURE — 99213 OFFICE O/P EST LOW 20 MIN: CPT | Performed by: PHYSICAL MEDICINE & REHABILITATION

## 2021-02-25 RX ORDER — AMLODIPINE BESYLATE 10 MG/1
10 TABLET ORAL DAILY
COMMUNITY

## 2021-02-25 RX ORDER — ASPIRIN 325 MG
TABLET, DELAYED RELEASE (ENTERIC COATED) ORAL
COMMUNITY

## 2021-02-25 RX ORDER — HYDROCHLOROTHIAZIDE 12.5 MG/1
TABLET ORAL
COMMUNITY
Start: 2021-01-05

## 2021-02-25 NOTE — PROGRESS NOTES
Thea Almeida [facility] [address].  [address], [address]  [place], [address] [place] [address] Street  Phone: [phone]  Fax: [phone]        Nadir Rawls  : 1975  PCP: Yumiko Stanley MD    PROGRESS NOTE      ASSESSMENT AND PLAN    Diagnoses and all orders for this visit:    1. Spondylosis of lumbar region without myelopathy or radiculopathy      1. 39 y.o. female with resolved sciatica, decreased low back pain. 2. Advised to continue HEP  3. Recommend short course PT for education, preventive strategies. 4. Given information on preventing injury    Follow-up and Dispositions    · Return if symptoms worsen or fail to improve. HISTORY OF PRESENT ILLNESS  Atiloi Combs is a 39 y.o. female. Pt was last evaluated 2021 for lumbar neuritis. Referred to PT. Pt states that she hasn't started PT yet because she had a COVID exposure. Affirms testing negative. Today, she reports that she hasn't been feeling back or leg pain/throbbing after stopping the Metformin. Notes that she restarted it about 2 days ago to test it out, and states she feels Ramiro. \" Pt mentions that she wants to go a endocrinologist instead of her PCP since he won't change her DM medication. Denies taking Topamax or Ibuprofen in a while. Pain Assessment  2021   Location of Pain Back;Leg   Pain Location Comment -   Location Modifiers Right   Severity of Pain -   Quality of Pain Throbbing; Kriss Gianotti; Aching;Burning   Quality of Pain Comment -   Duration of Pain A few hours   Duration of Pain Comment -   Frequency of Pain Intermittent   Aggravating Factors Other (Comment); Squatting;Bending   Aggravating Factors Comment stand too long   Limiting Behavior Yes   Relieving Factors Rest   Result of Injury No       Does pain radiate into extremities: No  Does patient have numbness/tingling: No  Does patient have weakness: No   Pt denies saddle paresthesias.     Denies persistent fevers, chills, weight changes, neurogenic bowel or bladder symptoms.      Treatments patient has tried:  Physical therapy:No  Doing HEP: Unknown  Beneficial medications: Topamax 50 mg QHS. Robaxin 500 mg. Ibuprofen. Failed medications: Unknown  Spinal injections: No     Spinal surgery- No.   Spine surgery consult: No.      L MRI 2020: disc protrusion on the L at L5-S1, facet changes  L3-4 w/relative stenosis      reviewed. PMHx of DM, HTN, H. pylori. 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 PAST MEDICAL HISTORY   Past Medical History:   Diagnosis Date    Diabetes ([address] 3/26/2015    H. pylori infection 3/26/2015    Hypertension         Past Surgical History:   Procedure Laterality Date    HX GYN          HX TUBAL LIGATION           MEDICATIONS      Current Outpatient Medications   Medication Sig Dispense Refill    amLODIPine (NORVASC) 10 mg tablet Take 10 mg by mouth daily.  cholecalciferol (VITAMIN D3) (50,000 UNITS /1250 MCG) capsule cholecalciferol (vitamin D3) 1,250 mcg (50,000 unit) capsule   TAKE 1 CAPSULE BY MOUTH ONCE A WEEK      hydroCHLOROthiazide (HYDRODIURIL) 12.5 mg tablet hydrochlorothiazide 12.5 mg tablet   TAKE 1 TABLET BY MOUTH ONCE DAILY      topiramate (Topamax) 50 mg tablet Take 1 Tab by mouth nightly. 30 Tab 2    metFORMIN (GLUCOPHAGE) 500 mg tablet Take 500 mg by mouth two (2) times daily (with meals).  methocarbamoL (Robaxin) 500 mg tablet Take 1 Tab by mouth two (2) times daily as needed for Pain (spasm). 60 Tab 1    ibuprofen (MOTRIN) 800 mg tablet Take 1 Tab by mouth two (2) times daily as needed for Pain. 60 Tab 1    amLODIPine-atorvastatin (CADUET) 10-10 mg per tablet Take 1 Tab by mouth daily.  lisinopril-hydrochlorothiazide (PRINZIDE, ZESTORETIC) 20-25 mg per tablet Take 1 Tab by mouth daily. 30 Tab 3       Controlled Substance Monitoring:    No flowsheet data found.      ALLERGIES    Allergies   Allergen Reactions    Lisinopril Anaphylaxis    Pcn [Penicillins] Anaphylaxis          PHYSICAL EXAMINATION  Visit Vitals  BP (!) 150/96 (BP 1 Location: Right lower arm, BP Patient Position: Sitting, BP Cuff Size: Large adult)   Pulse (!) 102   Temp 97.7 °F (36.5 °C) (Skin)   Ht 5' 2\" (1.575 m)   Wt 266 lb (120.7 kg)   SpO2 94%   BMI 48.65 kg/m²         Accompanied by self. Constitutional:  Well developed, well nourished, in no acute distress. Psychiatric: Affect and mood are appropriate. Integumentary: No rashes or abrasions noted on exposed areas. Cardiovascular/Peripheral Vascular: No peripheral edema is noted BLE. SPINE/MUSCULOSKELETAL EXAM    Lumbar spine:  No rash, ecchymosis, or gross obliquity. No fasciculations. No focal atrophy is noted. Good ROM, no pain. Non tender to palpation lumbar spine. No tenderness to palpation at the sciatic notch. SI joints non-tender. Trochanters non tender. MOTOR:       Hip Flex  Quads Hamstrings Ankle DF EHL Ankle PF   Right +4/5 +4/5 +4/5 +4/5 +4/5 +4/5   Left +4/5 +4/5 +4/5 +4/5 +4/5 +4/5     Straight Leg raise negative. No difficulty with tandem gait. Intact single leg toe rise and knee bend. Ambulation without assistive device. FWB. Written by Francie Wang, as dictated by Toni Moreland MD.    I, Dr. Toni Moreland MD, confirm that all documentation is accurate. Ms. Leung Guardian may have a reminder for a \"due or due soon\" health maintenance. I have asked that she contact her primary care provider for follow-up on this health maintenance.

## 2021-02-25 NOTE — PROGRESS NOTES
Ankita Simeon presents today for   Chief Complaint   Patient presents with    Back Pain    Leg Pain     right       Is someone accompanying this pt? no    Is the patient using any DME equipment during OV? no    Depression Screening:  3 most recent PHQ Screens 1/21/2021   PHQ Not Done -   Little interest or pleasure in doing things Not at all   Feeling down, depressed, irritable, or hopeless Not at all   Total Score PHQ 2 0       Learning Assessment:  Learning Assessment 3/15/2019   PRIMARY LEARNER Patient   PRIMARY LANGUAGE ENGLISH   LEARNER PREFERENCE PRIMARY DEMONSTRATION   ANSWERED BY Patient   RELATIONSHIP SELF       Coordination of Care:  1. Have you been to the ER, urgent care clinic since your last visit? no  Hospitalized since your last visit? no    2. Have you seen or consulted any other health care providers outside of the [address] [place] [address] since your last visit? Yes, cardiology, sleep medicine Include any pap smears or colon screening.  no

## 2021-02-25 NOTE — PATIENT INSTRUCTIONS
High Blood Pressure: Care Instructions Overview It's normal for blood pressure to go up and down throughout the day. But if it stays up, you have high blood pressure. Another name for high blood pressure is hypertension. Despite what a lot of people think, high blood pressure usually doesn't cause headaches or make you feel dizzy or lightheaded. It usually has no symptoms. But it does increase your risk of stroke, heart attack, and other problems. You and your doctor will talk about your risks of these problems based on your blood pressure. Your doctor will give you a goal for your blood pressure. Your goal will be based on your health and your age. Lifestyle changes, such as eating healthy and being active, are always important to help lower blood pressure. You might also take medicine to reach your blood pressure goal. 
Follow-up care is a key part of your treatment and safety. Be sure to make and go to all appointments, and call your doctor if you are having problems. It's also a good idea to know your test results and keep a list of the medicines you take. How can you care for yourself at home? Medical treatment · If you stop taking your medicine, your blood pressure will go back up. You may take one or more types of medicine to lower your blood pressure. Be safe with medicines. Take your medicine exactly as prescribed. Call your doctor if you think you are having a problem with your medicine. · Talk to your doctor before you start taking aspirin every day. Aspirin can help certain people lower their risk of a heart attack or stroke. But taking aspirin isn't right for everyone, because it can cause serious bleeding. · See your doctor regularly. You may need to see the doctor more often at first or until your blood pressure comes down. · If you are taking blood pressure medicine, talk to your doctor before you take decongestants or anti-inflammatory medicine, such as ibuprofen. Some of these medicines can raise blood pressure. · Learn how to check your blood pressure at home. Lifestyle changes · Stay at a healthy weight. This is especially important if you put on weight around the waist. Losing even 10 pounds can help you lower your blood pressure. · If your doctor recommends it, get more exercise. Walking is a good choice. Bit by bit, increase the amount you walk every day. Try for at least 30 minutes on most days of the week. You also may want to swim, bike, or do other activities. · Avoid or limit alcohol. Talk to your doctor about whether you can drink any alcohol. · Try to limit how much sodium you eat to less than 2,300 milligrams (mg) a day. Your doctor may ask you to try to eat less than 1,500 mg a day. · Eat plenty of fruits (such as bananas and oranges), vegetables, legumes, whole grains, and low-fat dairy products. · Lower the amount of saturated fat in your diet. Saturated fat is found in animal products such as milk, cheese, and meat. Limiting these foods may help you lose weight and also lower your risk for heart disease. · Do not smoke. Smoking increases your risk for heart attack and stroke. If you need help quitting, talk to your doctor about stop-smoking programs and medicines. These can increase your chances of quitting for good. When should you call for help? Call  911 anytime you think you may need emergency care. This may mean having symptoms that suggest that your blood pressure is causing a serious heart or blood vessel problem. Your blood pressure may be over 180/120. For example, call 911 if: 
  · You have symptoms of a heart attack. These may include: 
? Chest pain or pressure, or a strange feeling in the chest. 
? Sweating. ? Shortness of breath. ? Nausea or vomiting. ? Pain, pressure, or a strange feeling in the back, neck, jaw, or upper belly or in one or both shoulders or arms. ? Lightheadedness or sudden weakness. ? A fast or irregular heartbeat.  
  · You have symptoms of a stroke. These may include: 
? Sudden numbness, tingling, weakness, or loss of movement in your face, arm, or leg, especially on only one side of your body. ? Sudden vision changes. ? Sudden trouble speaking. ? Sudden confusion or trouble understanding simple statements. ? Sudden problems with walking or balance. ? A sudden, severe headache that is different from past headaches.  
  · You have severe back or belly pain. Do not wait until your blood pressure comes down on its own. Get help right away. Call your doctor now or seek immediate care if: 
  · Your blood pressure is much higher than normal (such as 180/120 or higher), but you don't have symptoms.  
  · You think high blood pressure is causing symptoms, such as: 
? Severe headache. 
? Blurry vision. Watch closely for changes in your health, and be sure to contact your doctor if: 
  · Your blood pressure measures higher than your doctor recommends at least 2 times. That means the top number is higher or the bottom number is higher, or both.  
  · You think you may be having side effects from your blood pressure medicine. Where can you learn more? Go to http://www.gray.com/ Enter I582 in the search box to learn more about \"High Blood Pressure: Care Instructions. \" Current as of: December 16, 2019               Content Version: 12.6 © 9920-0748 [employer]. Care instructions adapted under license by [employer] (which disclaims liability or warranty for this information). If you have questions about a medical condition or this instruction, always ask your healthcare professional. [address] any warranty or liability for your use of this information. Back Care and Preventing Injuries: Care Instructions Your Care Instructions You can hurt your back doing many everyday activities: lifting a heavy box, bending down to garden, exercising at the gym, and even getting out of bed. But you can keep your back strong and healthy by doing some exercises. You also can follow a few tips for sitting, sleeping, and lifting to avoid hurting your back again. Talk to your doctor before you start an exercise program. Ask for help if you want to learn more about keeping your back healthy. Follow-up care is a key part of your treatment and safety. Be sure to make and go to all appointments, and call your doctor if you are having problems. It's also a good idea to know your test results and keep a list of the medicines you take. How can you care for yourself at home? · Stay at a healthy weight to avoid strain on your lower back. · Do not smoke. Smoking increases the risk of osteoporosis, which weakens the spine. If you need help quitting, talk to your doctor about stop-smoking programs and medicines. These can increase your chances of quitting for good. · Make sure you sleep in a position that maintains your back's normal curves and on a mattress that feels comfortable. Sleep on your side with a pillow between your knees, or sleep on your back with a pillow under your knees. These positions can reduce strain on your back. · When you get out of bed, lie on your side and bend both knees. Drop your feet over the edge of the bed as you push up with both arms. Scoot to the edge of the bed. Make sure your feet are in line with your rear end (buttocks), and then stand up. · If you must stand for a long time, put one foot on a stool, ledge, or box. Exercise to strengthen your back and other muscles · Get at least 30 minutes of exercise on most days of the week. Walking is a good choice. You also may want to do other activities, such as running, swimming, cycling, or playing tennis or team sports. · Stretch your back muscles. Here are few exercises to try: 
? Lie on your back with your knees bent and your feet flat on the floor. Gently pull one bent knee to your chest. Put that foot back on the floor, and then pull the other knee to your chest. Hold for 15 to 30 seconds. Repeat 2 to 4 times. ? Do pelvic tilts. Lie on your back with your knees bent. Tighten your stomach muscles. Pull your belly button (navel) in and up toward your ribs. You should feel like your back is pressing to the floor and your hips and pelvis are slightly lifting off the floor. Hold for 6 seconds while breathing smoothly. · Keep your core muscles strong. The muscles of your back, belly (abdomen), and buttocks support your spine. ? Pull in your belly, and imagine pulling your navel toward your spine. Hold this for 6 seconds, then relax. Remember to keep breathing normally as you tense your muscles. ? Do curl-ups. Always do them with your knees bent. Keep your low back on the floor, and curl your shoulders toward your knees using a smooth, slow motion. Keep your arms folded across your chest. If this bothers your neck, try putting your hands behind your neck (not your head), with your elbows spread apart. ? Lie on your back with your knees bent and your feet flat on the floor. Tighten your belly muscles, and then push with your feet and raise your buttocks up a few inches. Hold this position 6 seconds as you continue to breathe normally, then lower yourself slowly to the floor. Repeat 8 to 12 times. ? If you like group exercise, try Pilates or yoga. These classes have poses that strengthen the core muscles. Protect your back when you sit · Place a small pillow, a rolled-up towel, or a lumbar roll in the curve of your back if you need extra support. · Sit in a chair that is low enough to let you place both feet flat on the floor with both knees nearly level with your hips. If your chair or desk is too high, use a foot rest to raise your knees. · When driving, keep your knees nearly level with your hips. Sit straight, and drive with both hands on the steering wheel. Your arms should be in a slightly bent position. · Try a kneeling chair, which helps tilt your hips forward. This takes pressure off your lower back. · Try sitting on an exercise ball. It can rock from side to side, which helps keep your back loose. Lift properly · Squat down, bending at the hips and knees only. If you need to, put one knee to the floor and extend your other knee in front of you, bent at a right angle (half kneeling). · Press your chest straight forward. This helps keep your upper back straight while keeping a slight arch in your low back. · Hold the load as close to your body as possible, at the level of your navel. · Use your feet to change direction, taking small steps. · Lead with your hips as you change direction. Keep your shoulders in line with your hips as you move. Do not twist your body. · Set down your load carefully, squatting with your knees and hips only. When should you call for help? Watch closely for changes in your health, and be sure to contact your doctor if you have any problems. Where can you learn more? Go to http://www.gray.com/ Enter S810 in the search box to learn more about \"Back Care and Preventing Injuries: Care Instructions. \" Current as of: March 2, 2020               Content Version: 12.6 © 6408-7977 [employer]. Care instructions adapted under license by [employer] (which disclaims liability or warranty for this information). If you have questions about a medical condition or this instruction, always ask your healthcare professional. [employer] disclaims any warranty or liability for your use of this information.

## 2021-10-26 DIAGNOSIS — M54.50 LUMBAR PAIN: ICD-10-CM

## 2021-10-26 DIAGNOSIS — M54.16 LUMBAR RADICULOPATHY: ICD-10-CM

## 2021-10-26 NOTE — TELEPHONE ENCOUNTER
Last Visit: 2/25/21 with MD Francois Dawson  Next Appointment: none  Previous Refill Encounter(s): 11/19/20 #60 with 1 refill    Requested Prescriptions     Pending Prescriptions Disp Refills    ibuprofen (MOTRIN) 800 mg tablet 60 Tablet 1     Sig: Take 1 Tablet by mouth two (2) times daily as needed for Pain.

## 2021-10-26 NOTE — TELEPHONE ENCOUNTER
Pt hasn't been seen in the office since 02/2021. No future appts scheduled at this time. Please review.

## 2021-10-27 RX ORDER — IBUPROFEN 800 MG/1
800 TABLET ORAL
Qty: 60 TABLET | Refills: 1 | OUTPATIENT
Start: 2021-10-27

## 2022-02-10 ENCOUNTER — OFFICE VISIT (OUTPATIENT)
Dept: ORTHOPEDIC SURGERY | Age: 47
End: 2022-02-10
Payer: MEDICAID

## 2022-02-10 VITALS
BODY MASS INDEX: 46.74 KG/M2 | HEART RATE: 86 BPM | HEIGHT: 62 IN | WEIGHT: 254 LBS | TEMPERATURE: 97.4 F | OXYGEN SATURATION: 98 %

## 2022-02-10 DIAGNOSIS — M47.816 SPONDYLOSIS OF LUMBAR REGION WITHOUT MYELOPATHY OR RADICULOPATHY: ICD-10-CM

## 2022-02-10 DIAGNOSIS — M54.16 LUMBAR RADICULOPATHY: ICD-10-CM

## 2022-02-10 DIAGNOSIS — M54.50 LUMBAR PAIN: Primary | ICD-10-CM

## 2022-02-10 DIAGNOSIS — M54.16 LUMBAR NEURITIS: ICD-10-CM

## 2022-02-10 PROCEDURE — 99204 OFFICE O/P NEW MOD 45 MIN: CPT | Performed by: NURSE PRACTITIONER

## 2022-02-10 RX ORDER — SEMAGLUTIDE 1.34 MG/ML
INJECTION, SOLUTION SUBCUTANEOUS
COMMUNITY
Start: 2022-01-27

## 2022-02-10 RX ORDER — LOSARTAN POTASSIUM 50 MG/1
50 TABLET ORAL DAILY
COMMUNITY
Start: 2022-01-01

## 2022-02-10 RX ORDER — METHOCARBAMOL 500 MG/1
500 TABLET, FILM COATED ORAL
Qty: 60 TABLET | Refills: 0 | Status: SHIPPED | OUTPATIENT
Start: 2022-02-10 | End: 2022-03-09 | Stop reason: SDUPTHER

## 2022-02-10 RX ORDER — METHYLPREDNISOLONE 4 MG/1
TABLET ORAL
Qty: 1 DOSE PACK | Refills: 0 | Status: SHIPPED | OUTPATIENT
Start: 2022-02-10 | End: 2022-03-28 | Stop reason: ALTCHOICE

## 2022-02-10 RX ORDER — TOPIRAMATE 50 MG/1
50 TABLET, FILM COATED ORAL
Qty: 30 TABLET | Refills: 0 | Status: SHIPPED | OUTPATIENT
Start: 2022-02-10 | End: 2022-03-09 | Stop reason: SDUPTHER

## 2022-02-10 NOTE — PROGRESS NOTES
Ruddy Moss presents today for   Chief Complaint   Patient presents with    Back Pain       Is someone accompanying this pt? no    Is the patient using any DME equipment during OV? no    Depression Screening:  3 most recent PHQ Screens 1/21/2021   PHQ Not Done -   Little interest or pleasure in doing things Not at all   Feeling down, depressed, irritable, or hopeless Not at all   Total Score PHQ 2 0       Learning Assessment:  Learning Assessment 3/15/2019   PRIMARY LEARNER Patient   PRIMARY LANGUAGE ENGLISH   LEARNER PREFERENCE PRIMARY DEMONSTRATION   ANSWERED BY Patient   RELATIONSHIP SELF       Coordination of Care:  1. Have you been to the ER, urgent care clinic since your last visit? no  Hospitalized since your last visit? no    2. Have you seen or consulted any other health care providers outside of the [address] [place] [address] since your last visit? Yes, endocrinology Include any pap smears or colon screening.  no

## 2022-02-10 NOTE — PROGRESS NOTES
Chief complaint   Chief Complaint   Patient presents with    Back Pain       History of Present Illness:  Gabino Dutton is a  42 y.o.  female comes in today after last being seen by Dr. Randall Timmons last February 25, 2021. She states that this past Saturday she took a job as a  at [employer] including work 3 hours before her back started hurting so bad she had to stop. She states now she has increased back pain with burning pain and numbness radiating to her right lower extremity through the thigh. She states feels like her leg will give out at times but she has not had any falls. She states she tried really taking her Topamax 50 mg at bedtime and ibuprofen 800 mg and that did help a little bit. She used to take Robaxin which used to help but she has run out of that. She states that all activities increase her pain. She is not sure if she can really find a job that will not increase her pain. 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 She is diabetic and her last A1c was 6.3 per the patient report she is a non-smoker. She denies fever bowel bladder dysfunction. Physical Exam: Patient is a 19-year-old female well-developed well-nourished who is alert and oriented with a normal mood and affect. She has a full weightbearing antalgic gait. She has 4-5 strength bilateral lower extremities. Negative straight leg raise. She has pain with hyperextension lumbar spine. Assessment and Plan: This is a patient does have some very small disc protrusions that do not really cause any stenosis and she also has lumbar spondylosis. I will give her a Medrol Dosepak to calm down this flare. I refilled her Robaxin and Topamax. We will put her in physical therapy. We will see her back in 6 weeks sooner if needed.   She requests to see Dr. Randall Timmons      Medications:  Current Outpatient Medications   Medication Sig Dispense Refill    losartan (COZAAR) 50 mg tablet Take 50 mg by mouth daily.      Ozempic 0.25 mg or 0.5 mg/dose (2 mg/1.5 ml) subq pen INJECT 0.5MG SUBCUTANEOUS WEEKLY      methylPREDNISolone (Medrol, Shekhar,) 4 mg tablet Per dose pack instructions 1 Dose Pack 0    topiramate (Topamax) 50 mg tablet Take 1 Tablet by mouth nightly. 30 Tablet 0    methocarbamoL (Robaxin) 500 mg tablet Take 1 Tablet by mouth two (2) times daily as needed for Pain (spasm). 60 Tablet 0    amLODIPine (NORVASC) 10 mg tablet Take 10 mg by mouth daily.  hydroCHLOROthiazide (HYDRODIURIL) 12.5 mg tablet hydrochlorothiazide 12.5 mg tablet   TAKE 1 TABLET BY MOUTH ONCE DAILY      cholecalciferol (VITAMIN D3) (50,000 UNITS /1250 MCG) capsule cholecalciferol (vitamin D3) 1,250 mcg (50,000 unit) capsule   TAKE 1 CAPSULE BY MOUTH ONCE A WEEK (Patient not taking: Reported on 2/10/2022)      metFORMIN (GLUCOPHAGE) 500 mg tablet Take 500 mg by mouth two (2) times daily (with meals).  ibuprofen (MOTRIN) 800 mg tablet Take 1 Tab by mouth two (2) times daily as needed for Pain. (Patient not taking: Reported on 2/10/2022) 60 Tab 1           Review of systems:    Past Medical History:   Diagnosis Date    Diabetes ([facility] [address] 3/26/2015    H. pylori infection 3/26/2015    Hypertension      Past Surgical History:   Procedure Laterality Date    HX GYN          HX TUBAL LIGATION       Social History     Socioeconomic History    Marital status: SINGLE     Spouse name: Not on file    Number of children: Not on file    Years of education: Not on file    Highest education level: Not on file   Occupational History    Occupation: Medical Assistant     Comment: [employer]   Tobacco Use    Smoking status: Never Smoker    Smokeless tobacco: Never Used   Substance and Sexual Activity    Alcohol use:  Yes     Alcohol/week: 2.5 standard drinks     Types: 3 Standard drinks or equivalent per week     Comment: occasionally, was every weekend stopped in 2014, 1-2 now per month May 2014    Drug use: No    Sexual activity: Yes     Partners: Male   Other Topics Concern    Not on file   Social History Narrative    Not on file     Social Determinants of Health     Financial Resource Strain:     Difficulty of Paying Living Expenses: Not on file   Food Insecurity:     Worried About Running Out of Food in the Last Year: Not on file    Uvaldo of Food in the Last Year: Not on file   Transportation Needs:     Lack of Transportation (Medical): Not on file    Lack of Transportation (Non-Medical): Not on file   Physical Activity:     Days of Exercise per Week: Not on file    Minutes of Exercise per Session: Not on file   Stress:     Feeling of Stress : Not on file   Social Connections:     Frequency of Communication with Friends and Family: Not on file    Frequency of Social Gatherings with Friends and Family: Not on file    Attends Confucianism Services: Not on file    Active Member of [address] [place] [address] [employer] or Organizations: Not on file    Attends Club or Organization Meetings: Not on file    Marital Status: Not on file   Intimate Partner Violence:     Fear of Current or Ex-Partner: Not on file    Emotionally Abused: Not on file    Physically Abused: Not on file    Sexually Abused: Not on file   Housing Stability:     Unable to Pay for Housing in the Last Year: Not on file    Number of [place] in the Last Year: Not on file    Unstable Housing in the Last Year: Not on file     Family History   Problem Relation Age of Onset    Diabetes Mother     Hypertension Father     Diabetes Father     Sleep Apnea Son 15    Diabetes Son 15        Type II    Heart Disease Neg Hx     Cancer Neg Hx     Stroke Neg Hx        Physical Exam:  Visit Vitals  Pulse 86   Temp 97.4 °F (36.3 °C) (Temporal)   Ht 5' 2\" (1.575 m)   Wt 254 lb (115.2 kg)   SpO2 98% Comment: RA   BMI 46.46 kg/m²     Pain Scale: 7/10       has been . reviewed and is appropriate          Diagnoses and all orders for this visit:    1.  Lumbar pain  - methylPREDNISolone (Medrol, Shekhar,) 4 mg tablet; Per dose pack instructions  -     methocarbamoL (Robaxin) 500 mg tablet; Take 1 Tablet by mouth two (2) times daily as needed for Pain (spasm). -     REFERRAL TO PHYSICAL THERAPY    2. Lumbar radiculopathy  -     REFERRAL TO PHYSICAL THERAPY    3. Spondylosis of lumbar region without myelopathy or radiculopathy  -     methylPREDNISolone (Medrol, Shekhar,) 4 mg tablet; Per dose pack instructions  -     REFERRAL TO PHYSICAL THERAPY    4. Lumbar neuritis  -     methylPREDNISolone (Medrol, Shekhar,) 4 mg tablet; Per dose pack instructions  -     topiramate (Topamax) 50 mg tablet; Take 1 Tablet by mouth nightly.  -     REFERRAL TO PHYSICAL THERAPY            Follow-up and Dispositions    · Return in about 6 weeks (around 3/24/2022) for with Dr. Bertha Carlson.              We have informed Leida Doss to notify us for immediate appointment if she has any worsening neurogical symptoms or if an emergency situation presents, then call 911

## 2022-03-07 ENCOUNTER — HOSPITAL ENCOUNTER (OUTPATIENT)
Dept: PHYSICAL THERAPY | Age: 47
Discharge: HOME | End: 2022-03-07
Attending: NURSE PRACTITIONER
Payer: MEDICAID

## 2022-03-07 DIAGNOSIS — M47.816 SPONDYLOSIS OF LUMBAR REGION WITHOUT MYELOPATHY OR RADICULOPATHY: ICD-10-CM

## 2022-03-07 DIAGNOSIS — M54.16 LUMBAR RADICULOPATHY: ICD-10-CM

## 2022-03-07 DIAGNOSIS — M54.16 LUMBAR NEURITIS: ICD-10-CM

## 2022-03-07 DIAGNOSIS — M54.50 LUMBAR PAIN: ICD-10-CM

## 2022-03-07 PROCEDURE — 97162 PT EVAL MOD COMPLEX 30 MIN: CPT

## 2022-03-07 NOTE — PROGRESS NOTES
PT DAILY TREATMENT NOTE     Patient Name: Amelia Holley  Date:3/7/2022  : 1975  [x]  Patient  Verified  Payor: Bozena Foss / Plan: [id number] [employer] [place] / Product Type: Managed Care Medicaid /    In time:8:25  Out time:9:04  Total Treatment Time (min): 39  Visit #: 1 of 10    Medicare/BCBS Only   Total Timed Codes (min):   1:1 Treatment Time:         Treatment Area: Lumbar pain [M54.50]  Lumbar radiculopathy [M54.16]  Spondylosis of lumbar region without myelopathy or radiculopathy [M47.816]  Lumbar neuritis [M54.16]    SUBJECTIVE  Pain Level (0-10 scale): 1.5/10  Any medication changes, allergies to medications, adverse drug reactions, diagnosis change, or new procedure performed?: [x] No    [] Yes (see summary sheet for update)  Subjective functional status/changes:   [] No changes reported    Chief Complaint: low back, right LE pain  History/Mechanism of Injury: 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 Celoron burning pain in low back, upon standing pain went away. Went through work day okay and went shopping with son, upon reaching to  shoe off shelf and turning to show son, right leg gave way and Pt went down, unable to bear weight on right LE. Pt went to MD and was given injections that did not help. It took 3 months for symptoms to subside and Pt be able to walk on right LE without A.D. However, Pt still has right LE weakness and LBP. Current Symptoms/Deficits: Limited standing/amb tolerance; sharp pains, paresthesias in right LE intermittently; LBP that affects ability to perform household chores; throbbing pain at night in right LE. Has tried to exercise - walk, do aqua aerobics - with only temporary relief.   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 \"    OBJECTIVE    21 min [x]Eval                  []Re-Eval     10 min Therapeutic Activity:  []  See flow sheet : Patient education on therapy assessment, prognosis, expectations for therapy sessions, patient goals, and HEP. Rationale: to improve the patients ability to adhere to HEP and therapy sessions for increased compliance when working toward therapy goals. 8 min Manual Therapy:  MET leg pull to correct right ilial upslip; MET to correct right posteriorly rotated innominate   Rationale: decrease pain and increase ROM to normalize gait pattern, reduce LBP with gait          With   [] TE   [x] TA   [] neuro   [] other: Patient Education: [x] Review HEP    [] Progressed/Changed HEP based on:   [] positioning   [] body mechanics   [] transfers   [] heat/ice application    [] other:      Other Objective/Functional Measures: FOTO 57 pts    Observation: increased right hip hike to advance right LE FWD during gait  Palpation: TTP at right Q/L, glute max, piriformis mm    Lumbar AROM:                                           AROM (% of full)                 Right Left Effect   Flexion 100%    Extension 100% pain   Side Bend 100% 100% p!     Rotation 100% p! 100%                                               -  Strength:   Right (/5) Left (/5)   Hip     Flexion 4- 5             Abduction 4- 5             Adduction 4- 5             Extension 3 5             ER 4- 5             IR 4- 5   Knee   Extension 5 5              Flexion 5 5   Ankle   Dorsiflexion 5 5               PF NT (reps) NT (reps)               Inversion 5 5               Eversion 5 5       Directional Preference Testing: none    Gait: antalgic right LE    Stair Negotiation: reciprocal or step to pattern depending on pain levels    Alignment: right ilial upslip; posteriorly rotated right innominate    Special Tests:      Right Left   Slump -   -   SLR - -   -   Right Left   Hamstring 90/90 -   -   Travis Case + +   Giovani Nishant       -   Right Left   LAQUITA       FADDIR     Hip Scour     -    Pain Level (0-10 scale) post treatment: 1.5/10    ASSESSMENT/Changes in Function: See POC    Patient will continue to benefit from skilled PT services to modify and progress therapeutic interventions, address functional mobility deficits, address ROM deficits, address strength deficits, analyze and address soft tissue restrictions, analyze and cue movement patterns, analyze and modify body mechanics/ergonomics, assess and modify postural abnormalities, address imbalance/dizziness and instruct in home and community integration to attain remaining goals.      [x]  See Plan of Care  []  See progress note/recertification  []  See Discharge Summary         Progress towards goals / Updated goals:  See POC    PLAN  [x]  Upgrade activities as tolerated     []  Continue plan of care  [x]  Update interventions per flow sheet       []  Discharge due to:_  []  Other:_      Bernabe Chavira, PT 3/7/2022  8:19 AM    Future Appointments   Date Time Provider Sergio Jon   3/14/2022  9:15 AM Karin Cade MD [facility] BS AMB   3/28/2022  2:15 PM Stefanie Marley MD [facility] BS AMB

## 2022-03-07 NOTE — PROGRESS NOTES
[facility], [address] [place] [address]  [phone] fax  Plan of Care/ Statement of Necessity for Physical Therapy Services     Patient name: Tony Roberson Start of Care: 3/7/2022   Referral source: Celia Edwards NP : 1975    Medical Diagnosis: Lumbar pain [M54.50]  Lumbar radiculopathy [M54.16]  Spondylosis of lumbar region without myelopathy or radiculopathy [M47.816]  Lumbar neuritis [M54.16]  Payor: [employer] / Plan: [employer] / Product Type: Managed Care Medicaid /  Onset Date:2/10/22    Treatment Diagnosis: LBP, Right LE pain   Prior Hospitalization: see medical history Provider#: [id number]   Medications: Verified on Patient summary List    Comorbidities: Arthritis; BMI > 30; DM Type II; HTN   Prior Level of Function: 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 Assessment/ key information: Pt is a 42 y.o. female who presents with c/o central LBP that radiates into right posterolateral thigh to ankle at times with paresthesias and knee buckling that has persisted since May 2021. Functional deficits include: limited positional tolerance of sitting/standing/amb, affecting ability to prepare meals, perform household chores; step to pattern with stairs and UE support on railing; intermittent sharp pains and throbbing at night, disrupting sleep. Upon exam, Pt exhibited palpable tenderness along right Q/L, glute max, piriformis mm; full L/S AROM all planes with most pain during S/B left, Rot right; impaired right hip strength; and core/glute instability.    Pt would benefit from skilled PT to address above deficits to improve Pt's function and ability to return to more active lifestyle with less pain. Evaluation Complexity History MEDIUM  Complexity : 1-2 comorbidities / personal factors will impact the outcome/ POC ; Examination MEDIUM Complexity : 3 Standardized tests and measures addressing body structure, function, activity limitation and / or participation in recreation  ;Presentation MEDIUM Complexity : Evolving with changing characteristics  ; Clinical Decision Making MEDIUM Complexity : FOTO score of 26-74  Overall Complexity Rating: MEDIUM  Problem List: pain affecting function, decrease ROM, decrease strength, impaired gait/ balance, decrease ADL/ functional abilitiies, decrease activity tolerance, decrease flexibility/ joint mobility and decrease transfer abilities   Treatment Plan may include any combination of the following: Therapeutic exercise, Therapeutic activities, Neuromuscular re-education, Physical agent/modality, Gait/balance training, Manual therapy, Patient education, Functional mobility training and Stair training  Patient / Family readiness to learn indicated by: asking questions, trying to perform skills and interest  Persons(s) to be included in education: patient (P)  Barriers to Learning/Limitations: None  Patient Goal (s): Relief.   Patient Self Reported Health Status: good  Rehabilitation Potential: good    Short Term Goals: To be accomplished in 1 weeks:  Goal: Pt to be compliant with initial HEP to improve core/hip strength and stability to increase positional tolerance with less LB strain. Status at last note/certification: Established and reviewed with Pt  Long Term Goals: To be accomplished in 5 weeks:  Goal: Pt to exhibit full L/S AROM all planes without pain increase for ease of reaching and bending. Status at last note/certification: S/B left, Rot right full with increased pain  Goal: Pt to increase standing/amb tolerance to 30 minutes without increased pain for ease of preparing meals, performing household chores.   Status at last note/certification: limited standing/amb tolerance from pain  Goal: Pt to report at least 50% improvement in right LE radicular symptoms to centralize pain to low back and increase ease of ADLs. Status at last note/certification: N/A; right posterolateral thigh to ankle paresthesias  Goal: Pt to report < 5/10 pain at worst to increase ease with ADLs. Status at last note/certification: 76/02 pain at worst  Goal: Pt to report FOTO score of 61 pts to show improved function and quality of life. Status at last note/certification: FOTO 57 pts     Frequency / Duration: Patient to be seen 2 times per week for 5 weeks. Patient/ Caregiver education and instruction: Diagnosis, prognosis, exercises   [x]  Plan of care has been reviewed with PTA    Edmar Chavira, PT 3/7/2022 8:19 AM  _____________________________________________________________________  I certify that the above Therapy Services are being furnished while the patient is under my care. I agree with the treatment plan and certify that this therapy is necessary.     Physician's Signature:____________Date:_________TIME:________     Shannen Mena NP  ** Signature, Date and Time must be completed for valid certification **    Please sign and return to [facility] [address]  [phone] fax

## 2022-03-08 ENCOUNTER — HOSPITAL ENCOUNTER (OUTPATIENT)
Dept: PHYSICAL THERAPY | Age: 47
Discharge: HOME | End: 2022-03-08
Attending: NURSE PRACTITIONER
Payer: MEDICAID

## 2022-03-08 PROCEDURE — 97530 THERAPEUTIC ACTIVITIES: CPT

## 2022-03-08 PROCEDURE — 97112 NEUROMUSCULAR REEDUCATION: CPT

## 2022-03-08 PROCEDURE — 97110 THERAPEUTIC EXERCISES: CPT

## 2022-03-08 NOTE — PROGRESS NOTES
PT DAILY TREATMENT NOTE     Patient Name: Stefanie Simmons  Date:3/8/2022  : 1975  [x]  Patient  Verified  Payor: Brandon Cowart / Plan: [id number] / Product Type: Managed Care Medicaid /    In time:817  Out time:900  Total Treatment Time (min): 43  Visit #: 2 of 10    Medicare/BCBS Only   Total Timed Codes (min):   1:1 Treatment Time:         Treatment Area: Low back pain, unspecified [M54.50]  Radiculopathy, lumbar region [M54.16]    SUBJECTIVE  Pain Level (0-10 scale): 3/10  Any medication changes, allergies to medications, adverse drug reactions, diagnosis change, or new procedure performed?: [x] No    [] Yes (see summary sheet for update)  Subjective functional status/changes:   [] No changes reported  Pt reports no changes since last session.     OBJECTIVE    15 min Therapeutic Exercise:  [] See flow sheet :   Rationale: increase ROM, increase strength and improve coordination to improve the patients ability to tolerate functional mobility     15 min Therapeutic Activity:  []  See flow sheet :   Rationale: increase ROM, increase strength and improve coordination  to improve the patients ability to tolerate ADLs and household activity     13 min Neuromuscular Re-education:  []  See flow sheet :   Rationale: increase strength, improve coordination, improve balance and increase proprioception  to improve the patients ability to tolerate core muscle activation and postural correction with functional mobility          With   [] TE   [] TA   [] neuro   [] other: Patient Education: [x] Review HEP    [] Progressed/Changed HEP based on:   [] positioning   [] body mechanics   [] transfers   [] heat/ice application    [] other:      Other Objective/Functional Measures: Established exercise program     Pain Level (0-10 scale) post treatment: 0/10    ASSESSMENT/Changes in Function: Pt seen by PT to address LBP and right LE pain, strength, ROM, postural correction and functional mobility for initial session following evaluation. Pt with good tolerance of the session with minimal to no lasting increase in pain or discomfort. Pt challenged with dynamic core stabilization exercises secondary to core weakness. PT provided intermittent verbal/tactile cues for optimal form and alignment. Patient will continue to benefit from skilled PT services to modify and progress therapeutic interventions, address functional mobility deficits, address ROM deficits, address strength deficits, analyze and address soft tissue restrictions, analyze and cue movement patterns, analyze and modify body mechanics/ergonomics, assess and modify postural abnormalities, address imbalance/dizziness and instruct in home and community integration to attain remaining goals. []  See Plan of Care  []  See progress note/recertification  []  See Discharge Summary         Progress towards goals / Updated goals:  Goal: Pt to be compliant with initial HEP to improve core/hip strength and stability to increase positional tolerance with less LB strain. Status at last note/certification: Established and reviewed with Pt  Long Term Goals: To be accomplished in 5 weeks:  Goal: Pt to exhibit full L/S AROM all planes without pain increase for ease of reaching and bending. Status at last note/certification: S/B left, Rot right full with increased pain  Goal: Pt to increase standing/amb tolerance to 30 minutes without increased pain for ease of preparing meals, performing household chores. Status at last note/certification: limited standing/amb tolerance from pain  Goal: Pt to report at least 50% improvement in right LE radicular symptoms to centralize pain to low back and increase ease of ADLs. Status at last note/certification: N/A; right posterolateral thigh to ankle paresthesias  Goal: Pt to report < 5/10 pain at worst to increase ease with ADLs.   Status at last note/certification: 10/04 pain at worst  Goal: Pt to report FOTO score of 61 pts to show improved function and quality of life.   Status at last note/certification: FOTO 57 pts     PLAN  [x]  Upgrade activities as tolerated     [x]  Continue plan of care  []  Update interventions per flow sheet       []  Discharge due to:_  []  Other:_      Rossy Argueta, PT 3/8/2022  8:23 AM    Future Appointments   Date Time Provider Sergio Jon   3/14/2022  9:15 AM Migue Srinivasan MD [facility] BS AMB   3/15/2022  8:15 AM Manuel Villarreal, PT [facility]   3/18/2022  8:15 AM Chata Chavira, PT [facility]   3/25/2022  8:15 AM Lazarus Divers, PT [facility]   3/28/2022  2:15 PM Doris Walls MD [facility] BS AMB   3/29/2022  8:15 AM Manuel Villarreal, PT [facility]   4/1/2022  7:30 AM Chata Chavira, PT [facility]

## 2022-03-09 ENCOUNTER — TELEPHONE (OUTPATIENT)
Dept: ORTHOPEDIC SURGERY | Age: 47
End: 2022-03-09

## 2022-03-09 DIAGNOSIS — M54.50 LUMBAR PAIN: ICD-10-CM

## 2022-03-09 DIAGNOSIS — M54.16 LUMBAR NEURITIS: ICD-10-CM

## 2022-03-09 RX ORDER — TOPIRAMATE 50 MG/1
50 TABLET, FILM COATED ORAL
Qty: 30 TABLET | Refills: 0 | Status: SHIPPED | OUTPATIENT
Start: 2022-03-09 | End: 2022-03-28 | Stop reason: SDUPTHER

## 2022-03-09 RX ORDER — METHOCARBAMOL 500 MG/1
500 TABLET, FILM COATED ORAL
Qty: 60 TABLET | Refills: 0 | Status: SHIPPED | OUTPATIENT
Start: 2022-03-09 | End: 2022-03-28 | Stop reason: SDUPTHER

## 2022-03-09 NOTE — TELEPHONE ENCOUNTER
Patient was last seen 02/10/22. She is requesting refills for Topamax and Robaxin to be sent to [place] on [address]. These were last written on 02/10/22. Please advise.      Patient [phone]

## 2022-03-09 NOTE — TELEPHONE ENCOUNTER
Pt has appt with Xin Julio on 03/28/22. Both medications were issued without refills. Please review.

## 2022-03-15 ENCOUNTER — APPOINTMENT (OUTPATIENT)
Dept: PHYSICAL THERAPY | Age: 47
End: 2022-03-15
Attending: NURSE PRACTITIONER
Payer: MEDICAID

## 2022-03-18 ENCOUNTER — HOSPITAL ENCOUNTER (OUTPATIENT)
Dept: PHYSICAL THERAPY | Age: 47
Discharge: HOME | End: 2022-03-18
Attending: NURSE PRACTITIONER
Payer: MEDICAID

## 2022-03-18 PROCEDURE — 97112 NEUROMUSCULAR REEDUCATION: CPT

## 2022-03-18 PROCEDURE — 97530 THERAPEUTIC ACTIVITIES: CPT

## 2022-03-18 PROCEDURE — 97110 THERAPEUTIC EXERCISES: CPT

## 2022-03-18 NOTE — PROGRESS NOTES
PT DAILY TREATMENT NOTE     Patient Name: Tiburcio Phipps  Date:3/18/2022  : 1975  [x]  Patient  Verified  Payor: Lanie Zavala / Plan: [facility] MEDICAID / Product Type: Managed Care Medicaid /    In time:8:18  Out time:8:58  Total Treatment Time (min): 40  Visit #: 3 of 10    Medicare/BCBS Only   Total Timed Codes (min):   1:1 Treatment Time:         Treatment Area: Low back pain, unspecified [M54.50]  Radiculopathy, lumbar region [M54.16]    SUBJECTIVE  Pain Level (0-10 scale): 0/10  Any medication changes, allergies to medications, adverse drug reactions, diagnosis change, or new procedure performed?: [x] No    [] Yes (see summary sheet for update)  Subjective functional status/changes:   [] No changes reported  \"I feel pretty good today. No pain. I can tell I'm getting stronger and I can be on my feet longer but I still notice that when I turn or twist too quickly, I have to grab onto something because my right leg hurts and doreen. \"    OBJECTIVE    12 min Therapeutic Exercise:  [] See flow sheet :   Rationale: increase ROM, increase strength and improve coordination to improve the patients ability to tolerate functional mobility     15 min Therapeutic Activity:  []  See flow sheet :   Rationale: increase ROM, increase strength and improve coordination  to improve the patients ability to tolerate ADLs and household activity     13 min Neuromuscular Re-education:  []  See flow sheet :   Rationale: increase strength, improve coordination, improve balance and increase proprioception  to improve the patients ability to tolerate core muscle activation and postural correction with functional mobility          With   [] TE   [] TA   [] neuro   [] other: Patient Education: [x] Review HEP    [] Progressed/Changed HEP based on:   [] positioning   [] body mechanics   [] transfers   [] heat/ice application    [] other:      Other Objective/Functional Measures:  Added monster walks with Chani Fischer step up/out (+) for right hip/glute stability. Pain Level (0-10 scale) post treatment: 0/10    ASSESSMENT/Changes in Function: Pt moving more quickly at today's session, noting reduction in overall pain levels. Right LE pain has lessened and strength is improving but knee buckling still noted with quick turns. Progressed treatment program to include hip/glute strengthening to increase ease of weight shifting onto right LE without pain, buckling. Pt appropriately challenged and noted only muscle fatigue post session but no pain. Pt declined modalities. Will continue per current plan to further improve core/trunk stability, hip stability to increase standing/amb tolerance and community activity participation. Patient will continue to benefit from skilled PT services to modify and progress therapeutic interventions, address functional mobility deficits, address ROM deficits, address strength deficits, analyze and address soft tissue restrictions, analyze and cue movement patterns, analyze and modify body mechanics/ergonomics, assess and modify postural abnormalities, address imbalance/dizziness and instruct in home and community integration to attain remaining goals. []  See Plan of Care  []  See progress note/recertification  []  See Discharge Summary         Progress towards goals / Updated goals:  Short Term Goals: To be accomplished in 1 week:  Goal: Pt to be compliant with initial HEP to improve core/hip strength and stability to increase positional tolerance with less LB strain. Status at last note/certification: Established and reviewed with Pt  Current: met - Pt reports compliance (3/18/22)  Long Term Goals: To be accomplished in 5 weeks:  Goal: Pt to exhibit full L/S AROM all planes without pain increase for ease of reaching and bending.   Status at last note/certification: S/B left, Rot right full with increased pain  Current:  Goal: Pt to increase standing/amb tolerance to 30 minutes without increased pain for ease of preparing meals, performing household chores. Status at last note/certification: limited standing/amb tolerance from pain  Current:  Goal: Pt to report at least 50% improvement in right LE radicular symptoms to centralize pain to low back and increase ease of ADLs. Status at last note/certification: N/A; right posterolateral thigh to ankle paresthesias  Current:  Goal: Pt to report < 5/10 pain at worst to increase ease with ADLs. Status at last note/certification: 93/46 pain at worst  Current:  Goal: Pt to report FOTO score of 61 pts to show improved function and quality of life.   Status at last note/certification: FOTO 57 pts   Current: reassess at MD note (3/18/22)    PLAN  [x]  Upgrade activities as tolerated     [x]  Continue plan of care  []  Update interventions per flow sheet       []  Discharge due to:_  []  Other:_      Ramon Chavira, PT 3/18/2022  8:23 AM    Future Appointments   Date Time Provider Sergio Jon   3/25/2022  8:15 AM Jazmyn Austin, PT [facility]   3/28/2022  2:15 PM Reggie David MD [facility] BS AMB   3/29/2022  8:15 AM Lakisha Rodarte, [facility]   4/1/2022  7:30 AM Ramon Chavira, [facility]

## 2022-03-20 PROBLEM — E66.01 OBESITY, MORBID (HCC): Status: ACTIVE | Noted: 2019-03-15

## 2022-03-28 ENCOUNTER — OFFICE VISIT (OUTPATIENT)
Dept: ORTHOPEDIC SURGERY | Age: 47
End: 2022-03-28
Payer: MEDICAID

## 2022-03-28 VITALS
BODY MASS INDEX: 44.53 KG/M2 | HEIGHT: 62 IN | OXYGEN SATURATION: 100 % | WEIGHT: 242 LBS | HEART RATE: 72 BPM | TEMPERATURE: 98 F

## 2022-03-28 DIAGNOSIS — M51.26 PROTRUSION OF LUMBAR INTERVERTEBRAL DISC: ICD-10-CM

## 2022-03-28 DIAGNOSIS — R29.898 RIGHT LEG WEAKNESS: ICD-10-CM

## 2022-03-28 DIAGNOSIS — M54.16 LUMBAR NEURITIS: ICD-10-CM

## 2022-03-28 DIAGNOSIS — M54.16 LUMBAR NEURITIS: Primary | ICD-10-CM

## 2022-03-28 PROCEDURE — 99214 OFFICE O/P EST MOD 30 MIN: CPT | Performed by: PHYSICAL MEDICINE & REHABILITATION

## 2022-03-28 RX ORDER — METHOCARBAMOL 500 MG/1
500 TABLET, FILM COATED ORAL
Qty: 60 TABLET | Refills: 1 | Status: SHIPPED | OUTPATIENT
Start: 2022-03-28 | End: 2022-05-16 | Stop reason: SDUPTHER

## 2022-03-28 RX ORDER — ROSUVASTATIN CALCIUM 20 MG/1
TABLET, COATED ORAL
COMMUNITY
Start: 2022-03-03

## 2022-03-28 RX ORDER — TOPIRAMATE 50 MG/1
50 TABLET, FILM COATED ORAL
Qty: 90 TABLET | Refills: 1 | Status: SHIPPED | OUTPATIENT
Start: 2022-03-28 | End: 2022-05-16 | Stop reason: SDUPTHER

## 2022-03-28 NOTE — PROGRESS NOTES
Tiburcio Phipps presents today for   Chief Complaint   Patient presents with    Back Pain     lower       Is someone accompanying this pt? n0    Is the patient using any DME equipment during OV? n0    Depression Screening:  3 most recent PHQ Screens 1/21/2021   PHQ Not Done -   Little interest or pleasure in doing things Not at all   Feeling down, depressed, irritable, or hopeless Not at all   Total Score PHQ 2 0       Learning Assessment:  Learning Assessment 3/15/2019   PRIMARY LEARNER Patient   PRIMARY LANGUAGE ENGLISH   LEARNER PREFERENCE PRIMARY DEMONSTRATION   ANSWERED BY Patient   RELATIONSHIP SELF       Coordination of Care:  1. Have you been to the ER, urgent care clinic since your last visit? n0  Hospitalized since your last visit? n0    2. Have you seen or consulted any other health care providers outside of the [address] [place] [address] since your last visit? Yes, cardi, diabetes dr Include any pap smears or colon screening.  no

## 2022-03-28 NOTE — LETTER
3/28/2022    Patient: Stefanie Simmons   YOB: 1975   Date of Visit: 3/28/2022     Regla Pierre MD  [address] [place] [id number]  Via Fax: [phone]    Dear Regla Pierre MD,      Thank you for referring Ms. Luis Fritz to [facility] for evaluation. My notes for this consultation are attached. If you have questions, please do not hesitate to call me. I look forward to following your patient along with you.       Sincerely,    Mabel Baldwin MD

## 2022-03-28 NOTE — PROGRESS NOTES
[facility] [address].  [address], [address]  [place], [address] [place] [address] Street  Phone: [phone]  Fax: [phone]        Hazel Courtney  : 1975  PCP: Desean Caruso MD    PROGRESS NOTE      ASSESSMENT AND PLAN    Diagnoses and all orders for this visit:    1. Lumbar neuritis  -     EMG ONE EXTREMITY LOWER RT; Future  -     topiramate (Topamax) 50 mg tablet; Take 1 Tablet by mouth nightly. 2. Protrusion of lumbar intervertebral disc  -     methocarbamoL (Robaxin) 500 mg tablet; Take 1 Tablet by mouth two (2) times daily as needed for Pain (spasm). 3. Right leg weakness        1. Elise Savage is a 42 y.o. female with chronic low back pain, with relative weakness of her right plantar flexors and hamstrings. MRI shows contralateral lumbosacral changes. 2. RLE EMG for weakness and thigh discomfort  3. I do not think that she would qualify for total and permanent disability based on her lumbar MRI findings. Follow-up and Dispositions    · Return in about 4 weeks (around 2022) for after EMG. HISTORY OF PRESENT ILLNESS      Gaurav Winslow is a 42 y.o. female presents for follow up of back pain. LV 2022 with NP Forrest, refilled Topamax and Robaxin, given MDP, referred PT. She continues to have pain in her back that radiates into her right thigh. Patient states that her standing and walking tolerance has increased ome. She notes throbbing, numbness, and tingling in her legs after prolonged walking. She feel weakness in her RLE especially with sudden movements. She states that she is consistently exercising, which helps. Patient has lost 10 pounds. Patient takes Topamax 50 mg QHS and Robaxin QAM with benefit. Denies side effects. She did not take her MDP. She states PT helps some.     Pain Assessment  3/28/2022   Location of Pain Back   Pain Location Comment -   Location Modifiers (No Data)   Severity of Pain 0   Quality of Pain Throbbing; Other (Comment); Burning   Quality of Pain Comment numbess pain feel the same way as when the foot falls asleep   Duration of Pain Other (Comment)   Duration of Pain Comment hurts when doing activties   Frequency of Pain Intermittent   Aggravating Factors Other (Comment)   Aggravating Factors Comment during activties   Limiting Behavior Yes   Relieving Factors Other (Comment)   Relieving Factors Comment excercise   Result of Injury Yes   Work-Related Injury Yes   Type of Injury Other (Comment)   Type of Injury Comment cutting someone toenails and bent over the wrong way     Onset: 2019, bending to help clip patient's toenail  Investigations:  L MRI 11/2020: disc protrusion on the L at L5-S1, facet changes  L3-4 w/relative stenosis    Treatments patient has tried:  Physical therapy:3/2022  Doing HEP: Unknown  Beneficial medications: Topamax 50 mg QHS. Robaxin 500 mg.  Ibuprofen.   Failed medications: Unknown  Spinal injections: TPI     Spinal surgery- No.   Spine surgery consult: No.      PMHx of DM, HTN, H. pylori.   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

## 2022-03-29 ENCOUNTER — TELEPHONE (OUTPATIENT)
Dept: PHYSICAL THERAPY | Age: 47
End: 2022-03-29

## 2022-04-01 ENCOUNTER — HOSPITAL ENCOUNTER (OUTPATIENT)
Dept: PHYSICAL THERAPY | Age: 47
Discharge: HOME | End: 2022-04-01
Attending: NURSE PRACTITIONER
Payer: MEDICAID

## 2022-04-01 PROCEDURE — 97112 NEUROMUSCULAR REEDUCATION: CPT

## 2022-04-01 PROCEDURE — 97110 THERAPEUTIC EXERCISES: CPT

## 2022-04-01 PROCEDURE — 97530 THERAPEUTIC ACTIVITIES: CPT

## 2022-04-01 NOTE — PROGRESS NOTES
PT DAILY TREATMENT NOTE     Patient Name: Martine Homans  Date:2022  : 1975  [x]  Patient  Verified  Payor: Deena Dang / Plan: [id number] [email or web address] / Product Type: Managed Care Medicaid /    In time:7:38  Out time:8:19  Total Treatment Time (min): 41  Visit #: 4 of 10    Medicare/BCBS Only   Total Timed Codes (min):   1:1 Treatment Time:         Treatment Area: Low back pain, unspecified [M54.50]  Radiculopathy, lumbar region [M54.16]    SUBJECTIVE  Pain Level (0-10 scale): 0/10  Any medication changes, allergies to medications, adverse drug reactions, diagnosis change, or new procedure performed?: [x] No    [] Yes (see summary sheet for update)  Subjective functional status/changes:   [] No changes reported  \"I didn't write down my appointments and I don't check my voicemail so I forgot to come. I don't have any pain right now. \"    OBJECTIVE    10 min Therapeutic Exercise:  [] See flow sheet :   Rationale: increase ROM, increase strength and improve coordination to improve the patients ability to tolerate functional mobility     10 min Therapeutic Activity:  []  See flow sheet :   Rationale: increase ROM, increase strength and improve coordination  to improve the patients ability to tolerate ADLs and household activity     21 min Neuromuscular Re-education:  []  See flow sheet :   Rationale: increase strength, improve coordination, improve balance and increase proprioception  to improve the patients ability to tolerate core muscle activation and postural correction with functional mobility          With   [] TE   [] TA   [] neuro   [] other: Patient Education: [x] Review HEP    [] Progressed/Changed HEP based on:   [] positioning   [] body mechanics   [] transfers   [] heat/ice application    [] other:      Other Objective/Functional Measures: Resumed exercises per flow sheet. Reassessed goals.     Pain Level (0-10 scale) post treatment: 0/10    ASSESSMENT/Changes in Function: Pt returned to skilled therapy today and was able to complete full program without onset of pain. Pt notes ability to stand/amb 2 hrs without back pain now. Pt still has intermittent, sharp pain down right LE upon standing after prolonged sitting and intermittent paresthesias in right foot at night that lasts about 5 minutes and then resolves. Patient will continue to benefit from skilled PT services to modify and progress therapeutic interventions, address functional mobility deficits, address ROM deficits, address strength deficits, analyze and address soft tissue restrictions, analyze and cue movement patterns, analyze and modify body mechanics/ergonomics, assess and modify postural abnormalities, address imbalance/dizziness and instruct in home and community integration to attain remaining goals. []  See Plan of Care  []  See progress note/recertification  []  See Discharge Summary         Progress towards goals / Updated goals:  Short Term Goals: To be accomplished in 1 week:  Goal: Pt to be compliant with initial HEP to improve core/hip strength and stability to increase positional tolerance with less LB strain. Status at last note/certification: Established and reviewed with Pt  Current: met - Pt reports compliance (3/18/22)  Long Term Goals: To be accomplished in 5 weeks:  Goal: Pt to exhibit full L/S AROM all planes without pain increase for ease of reaching and bending. Status at last note/certification: S/B left, Rot right full with increased pain  Current:   Goal: Pt to increase standing/amb tolerance to 30 minutes without increased pain for ease of preparing meals, performing household chores. Status at last note/certification: limited standing/amb tolerance from pain  Current: met - able to stand/amb 2 hrs without pain now (4/1/22)  Goal: Pt to report at least 50% improvement in right LE radicular symptoms to centralize pain to low back and increase ease of ADLs.   Status at last note/certification: N/A; right posterolateral thigh to ankle paresthesias  Current: progressing - intermittent paresthesias in feet at night that lasts 5 minutes, then goes away (4/1/22)  Goal: Pt to report < 5/10 pain at worst to increase ease with ADLs. Status at last note/certification: 22/21 pain at worst  Current: progressing - 0/10 pain on average but 10/10 intermittent, shocking pain upon standing to walk after prolonged sitting (4/1/22)  Goal: Pt to report FOTO score of 61 pts to show improved function and quality of life.   Status at last note/certification: FOTO 57 pts   Current: reassess at MD note (3/18/22)    PLAN  [x]  Upgrade activities as tolerated     [x]  Continue plan of care  []  Update interventions per flow sheet       []  Discharge due to:_  []  Other:_      Ernesto Chavira, PT 4/1/2022  8:23 AM    Future Appointments   Date Time Provider Sergio Jon   5/2/2022  1:30 PM Carol Bishop MD [facility] AMB   5/16/2022  1:30 PM Servando James MD [facility] AMB

## 2022-04-05 ENCOUNTER — HOSPITAL ENCOUNTER (OUTPATIENT)
Dept: PHYSICAL THERAPY | Age: 47
Discharge: HOME | End: 2022-04-05
Attending: NURSE PRACTITIONER
Payer: MEDICAID

## 2022-04-05 PROCEDURE — 97110 THERAPEUTIC EXERCISES: CPT

## 2022-04-05 PROCEDURE — 97112 NEUROMUSCULAR REEDUCATION: CPT

## 2022-04-05 PROCEDURE — 97530 THERAPEUTIC ACTIVITIES: CPT

## 2022-04-05 NOTE — PROGRESS NOTES
[facility], [address] [place] [address]  [phone] fax    Progress Note  Patient name: Dory Rolon Start of Care: 3/7/2022   Referral source: Jessa New NP : 1975   Medical/Treatment Diagnosis: Low back pain, unspecified [M54.50]  Radiculopathy, lumbar region [M54.16]  Payor: Zaira Winkler / Plan: [email or web address] / Product Type: Managed Care Medicaid /  Onset Date:2/10/2022     Prior Hospitalization: see medical history Provider#: [id number]   Medications: Verified on Patient Summary List    Comorbidities: Arthritis; BMI > 30; DM Type II; HTN   Prior Level of Function: 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 Status at last note/certification: Established and reviewed with Pt  Current: MET - Pt reports compliance (3/18/22)  Long Term Goals: To be accomplished in 5 weeks:  Goal: Pt to exhibit full L/S AROM all planes without pain increase for ease of reaching and bending. Status at last note/certification: S/B left, Rot right full with increased pain  Current: MET  Goal: Pt to increase standing/amb tolerance to 30 minutes without increased pain for ease of preparing meals, performing household chores. Status at last note/certification: limited standing/amb tolerance from pain  Current: MET - able to stand 2 hours/amb 30 minutes without pain now (22)  Goal: Pt to report at least 50% improvement in right LE radicular symptoms to centralize pain to low back and increase ease of ADLs.   Status at last note/certification: N/A; right posterolateral thigh to ankle paresthesias  Current: MET - 50%  Goal: Pt to report < 5/10 pain at worst to increase ease with ADLs. Status at last note/certification: 10/10 pain at worst  Current: Not MET - progressing - 0/10 pain on average but 10/10 intermittent, shocking pain upon standing to walk after prolonged sitting (4/1/22)  Goal: Pt to report FOTO score of 61 pts to show improved function and quality of life. Status at last note/certification: XVSY 07 KCW   Current: Not MET - FOTO 54pts however subjective reporting indicates progress    Key Functional Changes:   Functional Gains: back pain gone away with long periods of standing, walk further without back pain,  10,000 steps a day, lost 22 pounds    Functional Deficits: Stairs, carrying groceries, right leg pain and tenderness  % improvement: 80%  30 second sit to stand - 12x without UE support -   Single leg balance 23 left 30 right with aberrant motion  Lift and carry - scared to carry groceries  Forearm plank 23 seconds    Updated Goals: to be achieved in 5 weeks:  Goal: Pt to improve 30 second sit to stand to 15x without UE support from low plinth to improve LE functional strength. Status at last note/certification: 11x  Current:   Goal: Pt to increase recreational amb tolerance to 45 minutes without increased pain for ease of preparing meals, performing household chores. Status at last note/certification: 30 minutes  Current:   Goal: Pt to improve forearm plank to 40 seconds to demonstrate increase in core strength and stability to reduct low back pain. Status at last note/certification: 23 seconds  Current:   Goal: Pt to report < 5/10 pain at worst to increase ease with ADLs. Status at last note/certification: 10/10 pain at worst intermitten  Current:   Goal: Pt to report FOTO score of 61 pts to show improved function and quality of life.   Status at last note/certification: COQU 73 XCQ   Current:   Goal: Pt to lift and carry 10-15# to improve ease of carry groceries without symptom reproduction. Status at last note/certification: Not tested  Current:     ASSESSMENT/RECOMMENDATIONS: Patient has attended 5 skilled PT sessions to include the initial evaluation for low back pain. Patient reports 80% improvement in regards to functional activities and low back symptoms. Patient continues to report right leg symptoms reporting weakness and unsteadiness. Patient would benefit from continuing skilled PT to increase core strength and stability and LE strength to improve ease of performing ADLs and household chores. [x]Continue therapy per initial plan/protocol at a frequency of  1 x per week for 5 weeks  []Continue therapy with the following recommended changes:_____________________      _____________________________________________________________________  []Discontinue therapy progressing towards or have reached established goals  []Discontinue therapy due to lack of appreciable progress towards goals  []Discontinue therapy due to lack of attendance or compliance  []Await Physician's recommendations/decisions regarding therapy  []Other:________________________________________________________________    Thank you for this referral.   Jacqueline Montenegro, PT 4/5/2022 9:27 AM  NOTE TO PHYSICIAN:  PLEASE COMPLETE THE ORDERS BELOW AND   FAX TO [facility]: [phone]  If you are unable to process this request in 24 hours please contact our office: [phone]  []  I have read the above report and request that my patient continue as recommended. []  I have read the above report and request that my patient continue therapy with the following changes/special instructions:________________________________________  []I have read the above report and request that my patient be discharged from therapy.     Physician's Signature:____________Date:_________TIME:________     Clemente Holt NP  ** Signature, Date and Time must be completed for valid certification **

## 2022-04-05 NOTE — PROGRESS NOTES
PT DAILY TREATMENT NOTE     Patient Name: Valeri Campbell  Date:2022  : 1975  [x]  Patient  Verified  Payor: Shelby Aj / Plan: [employer] [place] / Product Type: Managed Care Medicaid /    In time:819  Out time:903  Total Treatment Time (min): 44  Visit #: 5 of 10    Medicare/BCBS Only   Total Timed Codes (min):   1:1 Treatment Time:         Treatment Area: Low back pain, unspecified [M54.50]  Radiculopathy, lumbar region [M54.16]    SUBJECTIVE  Pain Level (0-10 scale): 0  Any medication changes, allergies to medications, adverse drug reactions, diagnosis change, or new procedure performed?: [x] No    [] Yes (see summary sheet for update)  Subjective functional status/changes:   [] No changes reported  \"I feel good and I feel 80% better. I'm not having my back pain anymore. \"    OBJECTIVE    14 min Therapeutic Exercise:  [] See flow sheet :   Rationale: increase ROM and increase strength to improve the patients ability to perform ADLs with ease; update goals/HEP    15 min Therapeutic Activity:  []  See flow sheet :   Rationale: increase strength and improve coordination  to improve the patients ability to improve transfers; FOTO survey taken     15 min Neuromuscular Re-education:  []  See flow sheet :   Rationale: increase strength, improve coordination and increase proprioception  to improve the patients ability to maintain core stability with movement          With   [x] TE   [] TA   [] neuro   [] other: Patient Education: [x] Review HEP    [] Progressed/Changed HEP based on:   [] positioning   [] body mechanics   [] transfers   [] heat/ice application    [] other:      Other Objective/Functional Measures:   Functional Gains: back pain gone away with long periods of standing, walk further without back pain,  10,000 steps a day, lost 22 pounds    Functional Deficits: Stairs, carrying groceries, right leg pain and tenderness  % improvement: 80%  30 second sit to stand - 12x without UE support -   Single leg balance 23 left 30 right with aberrant motion  Lift and carry - scared to carry groceries  Forearm plank 23 seconds    Pain Level (0-10 scale) post treatment: 0    ASSESSMENT/Changes in Function: See Progress note    Patient will continue to benefit from skilled PT services to modify and progress therapeutic interventions, address functional mobility deficits, address ROM deficits, address strength deficits, analyze and address soft tissue restrictions, analyze and cue movement patterns, analyze and modify body mechanics/ergonomics, assess and modify postural abnormalities, address imbalance/dizziness and instruct in home and community integration to attain remaining goals. []  See Plan of Care  [x]  See progress note/recertification  []  See Discharge Summary         Progress towards goals / Updated goals:  Short Term Goals: To be accomplished in 1 week:  Goal: Pt to be compliant with initial HEP to improve core/hip strength and stability to increase positional tolerance with less LB strain. Status at last note/certification: Established and reviewed with Pt  Current: MET - Pt reports compliance (3/18/22)  Long Term Goals: To be accomplished in 5 weeks:  Goal: Pt to exhibit full L/S AROM all planes without pain increase for ease of reaching and bending. Status at last note/certification: S/B left, Rot right full with increased pain  Current: MET  Goal: Pt to increase standing/amb tolerance to 30 minutes without increased pain for ease of preparing meals, performing household chores. Status at last note/certification: limited standing/amb tolerance from pain  Current: MET - able to stand 2 hours/amb 30 minutes without pain now (4/1/22)  Goal: Pt to report at least 50% improvement in right LE radicular symptoms to centralize pain to low back and increase ease of ADLs.   Status at last note/certification: N/A; right posterolateral thigh to ankle paresthesias  Current: MET - 50%  Goal: Pt to report < 5/10 pain at worst to increase ease with ADLs. Status at last note/certification: 10/10 pain at worst  Current: Not MET - progressing - 0/10 pain on average but 10/10 intermittent, shocking pain upon standing to walk after prolonged sitting (4/1/22)  Goal: Pt to report FOTO score of 61 pts to show improved function and quality of life.   Status at last note/certification: PZPS 04 QMS   Current: Not MET - FOTO 54pts however subjective reporting indicates progress    PLAN  []  Upgrade activities as tolerated     [x]  Continue plan of care  []  Update interventions per flow sheet       []  Discharge due to:_  []  Other:_      Noah Valenzuela, PT 4/5/2022  8:30 AM    Future Appointments   Date Time Provider Sergio Jon   4/7/2022  8:15 AM Dorcus Rhyme [facility]   4/12/2022  8:15 AM Kelleen Heritage [facility]   4/19/2022  8:15 AM Kelleen [facility]   4/22/2022  8:15 AM Shree Chavira, PT [facility]   4/26/2022  8:15 AM Kelleen Heritage [facility]   4/29/2022  8:15 AM Shree Chavira, PT [facility]   5/2/2022  1:30 PM Jeff Puckett MD [facility] AMB   5/16/2022  1:30 PM Mahi Carroll MD [facility] AMB

## 2022-04-07 ENCOUNTER — APPOINTMENT (OUTPATIENT)
Dept: PHYSICAL THERAPY | Age: 47
End: 2022-04-07
Attending: NURSE PRACTITIONER
Payer: MEDICAID

## 2022-04-12 ENCOUNTER — TELEPHONE (OUTPATIENT)
Dept: PHYSICAL THERAPY | Age: 47
End: 2022-04-12

## 2022-04-22 ENCOUNTER — APPOINTMENT (OUTPATIENT)
Dept: PHYSICAL THERAPY | Age: 47
End: 2022-04-22
Attending: NURSE PRACTITIONER
Payer: MEDICAID

## 2022-04-26 ENCOUNTER — TELEPHONE (OUTPATIENT)
Dept: PHYSICAL THERAPY | Age: 47
End: 2022-04-26

## 2022-04-29 ENCOUNTER — APPOINTMENT (OUTPATIENT)
Dept: PHYSICAL THERAPY | Age: 47
End: 2022-04-29
Attending: NURSE PRACTITIONER
Payer: MEDICAID

## 2022-05-02 ENCOUNTER — OFFICE VISIT (OUTPATIENT)
Dept: ORTHOPEDIC SURGERY | Age: 47
End: 2022-05-02
Payer: MEDICAID

## 2022-05-02 VITALS
TEMPERATURE: 97 F | DIASTOLIC BLOOD PRESSURE: 79 MMHG | BODY MASS INDEX: 42.14 KG/M2 | SYSTOLIC BLOOD PRESSURE: 127 MMHG | WEIGHT: 229 LBS | HEART RATE: 63 BPM | HEIGHT: 62 IN | OXYGEN SATURATION: 100 %

## 2022-05-02 DIAGNOSIS — R29.898 RIGHT LEG WEAKNESS: Primary | ICD-10-CM

## 2022-05-02 DIAGNOSIS — R29.898 RIGHT LEG WEAKNESS: ICD-10-CM

## 2022-05-02 PROCEDURE — 95908 NRV CNDJ TST 3-4 STUDIES: CPT | Performed by: PHYSICAL MEDICINE & REHABILITATION

## 2022-05-02 PROCEDURE — 95886 MUSC TEST DONE W/N TEST COMP: CPT | Performed by: PHYSICAL MEDICINE & REHABILITATION

## 2022-05-02 NOTE — LETTER
5/2/2022    Patient: Dory Rolon   YOB: 1975   Date of Visit: 5/2/2022     Ignacio Eddy, [address] [address]  Via Fax: [phone]    Dear Ignacio Eddy MD,      Thank you for referring Ms. Inocencia Valle to [facility] for evaluation. My notes for this consultation are attached. If you have questions, please do not hesitate to call me. I look forward to following your patient along with you.       Sincerely,    Katelin Castillo MD

## 2022-05-02 NOTE — PROGRESS NOTES
[address]  [address], [address]  [address]  Phone: [phone]  Fax: [phone]        Rosa Duenas  : 1975  PCP: Malia Galvez MD  2022    ELECTROMYOGRAPHY AND NERVE CONDUCTION STUDIES    Atilio Lux was referred by Dr. Navjot Godfrey for electrodiagnostic evaluation of RLE pain and weakness. NCV & EMG Findings:  All nerve conduction studies (as indicated in the following tables) were within normal limits. All examined muscles (as indicated in the following table) showed no evidence of electrical instability. INTERPRETATION    This was a normal nerve conduction and EMG study showing there to be no signs of neuropathy, myopathy, or radiculopathy in the nerves and muscles tested. CLINICAL INTERPRETATION    Her electrodiagnostic findings do not appear to explain her right leg symptoms. HISTORY OF PRESENT ILLNESS  Atilio Lux is a 42 y.o. female. Pt presents today for RLE EMG evaluation of RLE pain and weakness. She reports mostly pain in the right thigh. She notes that her symptoms are worse with sudden turns/movements, and her leg will occasionally give out on her. She has been focusing on weight loss, and she has lost about 30-40 lbs. Her back pain has improved with weight loss, but her leg pain persists. She notes that after she did squats recently, her left thigh felt fine, but her right thigh felt like spaghetti. PmHx: DM    Lumbar MRI 2020:  IMPRESSION:     1. L3/L4 small posterior central disc protrusion with indentation of the CSF  space, no focal nerve root impingement. Mild central canal narrowing. Lateral  recess encroachment without nerve root impingement.     2. L5/S1 posterior central disc protrusion and disc bulge without central canal  stenosis or nerve root impingement.  There is focal narrowing of the left foramen  at the level of the pedicle secondary to disc bulge and facet batsheva.    PAST MEDICAL HISTORY   Past Medical History:   Diagnosis Date    Diabetes ([address] 3/26/2015    H. pylori infection 3/26/2015    Hypertension 2006       Past Surgical History:   Procedure Laterality Date    HX GYN          HX TUBAL LIGATION     . MEDICATIONS    Current Outpatient Medications   Medication Sig Dispense Refill    rosuvastatin (CRESTOR) 20 mg tablet rosuvastatin 20 mg tablet   TAKE 1 TABLET BY MOUTH ONCE DAILY      topiramate (Topamax) 50 mg tablet Take 1 Tablet by mouth nightly. 90 Tablet 1    methocarbamoL (Robaxin) 500 mg tablet Take 1 Tablet by mouth two (2) times daily as needed for Pain (spasm). 60 Tablet 1    losartan (COZAAR) 50 mg tablet Take 50 mg by mouth daily.  Ozempic 0.25 mg or 0.5 mg/dose (2 mg/1.5 ml) subq pen INJECT 0.5MG SUBCUTANEOUS WEEKLY      amLODIPine (NORVASC) 10 mg tablet Take 10 mg by mouth daily.  cholecalciferol (VITAMIN D3) (50,000 UNITS /1250 MCG) capsule cholecalciferol (vitamin D3) 1,250 mcg (50,000 unit) capsule   TAKE 1 CAPSULE BY MOUTH ONCE A WEEK      hydroCHLOROthiazide (HYDRODIURIL) 12.5 mg tablet hydrochlorothiazide 12.5 mg tablet   TAKE 1 TABLET BY MOUTH ONCE DAILY      ibuprofen (MOTRIN) 800 mg tablet Take 1 Tab by mouth two (2) times daily as needed for Pain. 60 Tab 1        ALLERGIES  Allergies   Allergen Reactions    Lisinopril Anaphylaxis    Pcn [Penicillins] Anaphylaxis          SOCIAL HISTORY    Social History     Socioeconomic History    Marital status: SINGLE   Occupational History    Occupation: Medical Assistant     Comment: [de-identified] [employer]   Tobacco Use    Smoking status: Never Smoker    Smokeless tobacco: Never Used   Substance and Sexual Activity    Alcohol use:  Yes     Alcohol/week: 2.5 standard drinks     Types: 3 Standard drinks or equivalent per week     Comment: occasionally, was every weekend stopped in 2014, 1-2 now per month May 2014    Drug use: No    Sexual activity: Yes Partners: Male       FAMILY HISTORY  Family History   Problem Relation Age of Onset    Diabetes Mother     Hypertension Father     Diabetes Father     Sleep Apnea Son 13    Diabetes Son 15        Type II    Heart Disease Neg Hx     Cancer Neg Hx     Stroke Neg Hx          PHYSICAL EXAMINATION  Visit Vitals  /79 (BP 1 Location: Right arm, BP Patient Position: Sitting, BP Cuff Size: Adult)   Pulse 63   Temp 97 °F (36.1 °C)   Ht 5' 2\" (1.575 m)   Wt 229 lb (103.9 kg)   SpO2 100%   BMI 41.88 kg/m²       Pain Assessment  5/2/2022   Location of Pain -   Pain Location Comment -   Location Modifiers -   Severity of Pain 0   Quality of Pain -   Quality of Pain Comment -   Duration of Pain -   Duration of Pain Comment -   Frequency of Pain -   Aggravating Factors -   Aggravating Factors Comment -   Limiting Behavior -   Relieving Factors -   Relieving Factors Comment -   Result of Injury -   Work-Related Injury -   Type of Injury -   Type of Injury Comment -           Constitutional:  Well developed, well nourished, in no acute distress. Psychiatric: Affect and mood are appropriate. Integumentary: No rashes or abrasions noted on exposed areas. SPINE/MUSCULOSKELETAL EXAM    On brief examination: Weakness with knee extension on the right.       NCV & EMG Findings:  Nerve Conduction Studies  Anti Sensory Summary Table     Stim Site NR Peak (ms) Norm Peak (ms) O-P Amp (µV) Norm O-P Amp Site1 Site2 Delta-P (ms) Dist (cm) David (m/s) Norm David (m/s)   Right Sup Fibular Anti Sensory (Ant Lat Mall)   14 cm    3.5 <4.4 9.7 >5.0 14 cm Ant Lat Mall 3.5 14.0 40 >32   Right Sural Anti Sensory (Lat Mall)   Calf    3.5 <4.5 7.8 >4.0 Calf Lat Mall 3.5 14.0 40 >35     Motor Summary Table     Stim Site NR Onset (ms) Norm Onset (ms) O-P Amp (mV) Norm O-P Amp Site1 Site2 Delta-0 (ms) Dist (cm) David (m/s) Norm David (m/s)   Right Fibular Motor (Ext Dig Brev)   Ankle    4.0 <6.5 5.0 >1.3 B Fib Ankle 5.5 29.0 53 >38   B Fib    9.5 5. 3  Poplt B Fib 1.5 8.0 53 >40   Poplt    11.0  5.1          Right Tibial Motor (Abd Carrillo Brev)   Ankle    3.9 <6.1 9.4 >4.4 Knee Ankle 8.6 38.0 44 >39   Knee    12.5  5.0            EMG     Side Muscle Nerve Root Ins Act Fibs Psw Amp Dur Poly Recrt Int Darleen [place] Comment   Right VastusMed Femoral L2-4 Nml Nml Nml Nml Nml 0 Nml Nml    Right AntTibialis Dp Br Fibular L4-5 Nml Nml Nml Nml Nml 0 Nml Nml    Right Gastroc Tibial S1-2 Nml Nml Nml Nml Nml 0 Nml Nml    Right RectFemoris Femoral L2-4 Nml Nml Nml Nml Nml 0 Nml Nml    Right AdductorLong Obturator L2-4 Nml Nml Nml Nml Nml 0 Nml Nml    Right Lumbo Parasp Up Rami L1-2 Nml Nml Nml         Right Lumbo Parasp Mid Rami L3-4 Nml Nml Nml         Right Lumbo Parasp Low Rami L5-S1 Nml Nml Nml             Nerve Conduction Studies  Anti Sensory Left/Right Comparison     Stim Site L Lat (ms) R Lat (ms) L-R Lat (ms) L Amp (µV) R Amp (µV) L-R Amp (%) Site1 Site2 L David (m/s) R David (m/s) L-R David (m/s)   Sup Fibular Anti Sensory (Ant Lat Mall)   14 cm  3.5   9.7  14 cm Ant Lat Mall  40    Sural Anti Sensory (Lat Mall)   Calf  3.5   7.8  Calf Lat Mall  40      Motor Left/Right Comparison     Stim Site L Lat (ms) R Lat (ms) L-R Lat (ms) L Amp (mV) R Amp (mV) L-R Amp (%) Site1 Site2 L David (m/s) R David (m/s) L-R David (m/s)   Fibular Motor (Ext Dig Brev)   Ankle  4.0   5.0  B Fib Ankle  53    B Fib  9.5   5.3  Poplt B Fib  53    Poplt  11.0   5.1         Tibial Motor (Abd Carrillo Brev)   Ankle  3.9   9.4  Knee Ankle  44    Knee  12.5   5.0               Waveforms:                    [facility]  OFFICE PROCEDURE PROGRESS NOTE        Chart reviewed for the following:   Vane DAVILA, have reviewed the History, Physical and updated the Allergic reactions for Helleana A Duke     TIME OUT performed immediately prior to start of procedure:   Vane DAVILA, have performed the following reviews on Helleana A Duke prior to the start of the procedure: * Patient was identified by name and date of birth   * Agreement on procedure being performed was verified  * Risks and Benefits explained to the patient  * Procedure site verified and marked as necessary  * Patient was positioned for comfort  * Consent was signed and verified     Time: 2:13 PM    Date of procedure: 5/2/2022    Procedure performed by:  Mallika Davis MD    Provider accompanied by: Angelo. Patient accompanied by: Self.     How tolerated by patient: tolerated the procedure well with no complications    Post Procedural Pain Scale: 0 - No Hurt    Comments: none    Written by Radha Wright, [address] [facility] [address] [address] as dictated by Yamile Callaway MD

## 2022-05-11 NOTE — PROGRESS NOTES
[facility] SHARON MENG [facility], [address] [place] [address]  [phone] fax    Discharge Summary    Patient name: Rianna Orozco Start of Care: 3/7/2022   Referral source: Laurel Bingham NP : 1975   Medical/Treatment Diagnosis: Low back pain, unspecified [M54.50]  Radiculopathy, lumbar region [M54.16]  Payor: Verena Hernandes / Plan: [id number] / Product Type: Managed Care Medicaid /  Onset Date:2/10/2022     Prior Hospitalization: see medical history Provider#: [id number]   Medications: Verified on Patient Summary List    Comorbidities: Arthritis; BMI > 30; DM Type II; HTN   Prior Level of Function: 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 Status at last note/certification: 11x  Current: Not MET - unable to assess  Goal: Pt to increase recreational amb tolerance to 45 minutes without increased pain for ease of preparing meals, performing household chores. Status at last note/certification: 30 minutes  Current: Not MET - unable to assess  Goal: Pt to improve forearm plank to 40 seconds to demonstrate increase in core strength and stability to reduct low back pain. Status at last note/certification: 23 seconds  Current: Not MET - unable to assess  Goal: Pt to report < 5/10 pain at worst to increase ease with ADLs. Status at last note/certification: 10/10 pain at worst intermitten  Current: Not MET - unable to assess  Goal: Pt to report FOTO score of 61 pts to show improved function and quality of life.   Status at last note/certification: PHJE 49 CZV   Current: Not MET - unable to assess  Goal: Pt to lift and carry 10-15# to improve ease of carry groceries without symptom reproduction. Status at last note/certification: Not tested  Current: Not MET - unable to assess      Assessment/ Summary of Care: Pt attended 5 visits, then no showed the next three visits. Pt was contacted via phone and asked to be discharged at this time. Thus, Pt's goals were unable to be further reassessed. Due to clinic attendance policy, Pt will be discharged at this time with no further instructions.   Thank you for this referral.    RECOMMENDATIONS:  [x]Discontinue therapy: [x]Patient has reached or is progressing toward set goals      [x]Patient is non-compliant or has abdicated      []Due to lack of appreciable progress towards set [address] PT 5/11/2022 4:14 PM

## 2022-05-16 ENCOUNTER — OFFICE VISIT (OUTPATIENT)
Dept: ORTHOPEDIC SURGERY | Age: 47
End: 2022-05-16
Payer: MEDICAID

## 2022-05-16 VITALS
BODY MASS INDEX: 41.55 KG/M2 | OXYGEN SATURATION: 100 % | WEIGHT: 225.8 LBS | TEMPERATURE: 98.2 F | HEART RATE: 74 BPM | HEIGHT: 62 IN

## 2022-05-16 DIAGNOSIS — M54.16 LUMBAR NEURITIS: ICD-10-CM

## 2022-05-16 DIAGNOSIS — M51.26 PROTRUSION OF LUMBAR INTERVERTEBRAL DISC: ICD-10-CM

## 2022-05-16 PROCEDURE — 99214 OFFICE O/P EST MOD 30 MIN: CPT | Performed by: PHYSICAL MEDICINE & REHABILITATION

## 2022-05-16 RX ORDER — METHOCARBAMOL 500 MG/1
500 TABLET, FILM COATED ORAL
Qty: 90 TABLET | Refills: 1 | Status: SHIPPED | OUTPATIENT
Start: 2022-05-16

## 2022-05-16 RX ORDER — AZELASTINE 1 MG/ML
SPRAY, METERED NASAL
COMMUNITY
Start: 2022-03-28

## 2022-05-16 RX ORDER — MONTELUKAST SODIUM 10 MG/1
TABLET ORAL
COMMUNITY
Start: 2022-03-28

## 2022-05-16 RX ORDER — TOPIRAMATE 50 MG/1
50 TABLET, FILM COATED ORAL DAILY
Qty: 90 TABLET | Refills: 1 | Status: SHIPPED | OUTPATIENT
Start: 2022-05-16

## 2022-05-16 RX ORDER — FLUCONAZOLE 150 MG/1
TABLET ORAL
COMMUNITY
Start: 2022-04-15

## 2022-05-16 RX ORDER — METRONIDAZOLE 500 MG/1
TABLET ORAL
COMMUNITY
Start: 2022-04-12

## 2022-05-16 RX ORDER — AZITHROMYCIN 250 MG/1
TABLET, FILM COATED ORAL
COMMUNITY
Start: 2022-05-06

## 2022-05-16 RX ORDER — IPRATROPIUM BROMIDE 21 UG/1
SPRAY, METERED NASAL
COMMUNITY
Start: 2022-03-28 | End: 2022-06-26

## 2022-05-16 NOTE — PROGRESS NOTES
[facility]  [address], [address]  [place], [address] [place] [address] Street  Phone: [phone]  Fax: [phone]        Katlin Douglas  : 1975  PCP: Idalia Hauser MD    PROGRESS NOTE      ASSESSMENT AND PLAN    Diagnoses and all orders for this visit:    1. Protrusion of lumbar intervertebral disc    2. Lumbar neuritis  -     methocarbamoL (Robaxin) 500 mg tablet; Take 1 Tablet by mouth daily as needed for Pain (spasm). -     topiramate (Topamax) 50 mg tablet; Take 1 Tablet by mouth daily. 1. Guillermina Jones is a 42 y.o. female with unexplained episodes of LLE transient weakness. MRI no focal impingement, EDx negative. Neuro eval if progressive symptoms. 2. RF Topamax  3. RF Robaxin  4. Cont HEP  5. Patient is stable and may receive future refills through PCP if agreeable    Follow-up and Dispositions    · Return if symptoms worsen or fail to improve. HISTORY OF PRESENT ILLNESS      Joseph Alcocer is a 42 y.o. female presents for follow up of leg pain/EMG. Patient continues to have RLE pain and weakness. Her symptoms are worse with sudden movements, and her leg will occasionally give out on her. She has to take breaks when walking. Patient states that she attempts to exercise 4 times a week. Denies hip,knee, ankle pain. She takes Topamax QAM and Robaxin QAM with benefit. Denies side effects. Pain Assessment  2022   Location of Pain Leg   Pain Location Comment -   Location Modifiers -   Severity of Pain 0   Quality of Pain Sharp; Aching   Quality of Pain Comment -   Duration of Pain A few minutes   Duration of Pain Comment -   Frequency of Pain Intermittent   Aggravating Factors Other (Comment)   Aggravating Factors Comment movement   Limiting Behavior No   Relieving Factors Other (Comment)   Relieving Factors Comment not doing nothing   Result of Injury Yes   Work-Related Injury Yes   Type of Injury Other (Comment)   Type of Injury Comment bend         Onset: 2019, bending to help clip patient's toenail    Investigations:  RLE EMG 5/2022: normal  L MRI 11/2020: disc protrusion on the L at L5-S1, facet changes  L3-4 w/relative stenosis     Treatments patient has tried:  Physical therapy:3/2022  Doing HEP: Unknown  Beneficial medications: Topamax 50 mg QHS. Robaxin 500 mg.  Ibuprofen.   Failed medications: Unknown  Spinal injections: TPI     Spinal surgery- No.   Spine surgery consult: No.      PMHx of DM, HTN, H. pylori.   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

## 2022-05-16 NOTE — LETTER
5/17/2022    Patient: Elvis Lennox   YOB: 1975   Date of Visit: 5/16/2022     Ibrahima Dinh, [address] [place] [address] Naila Gomez [phone]  Via Fax: [phone]    Dear Ibrahima Dinh MD,      Thank you for referring Ms. Wood Martinez to [facility] for evaluation. My notes for this consultation are attached. If you have questions, please do not hesitate to call me. I look forward to following your patient along with you.       Sincerely,    Lula Clements MD

## 2022-05-16 NOTE — PROGRESS NOTES
Dory Rolon presents today for   Chief Complaint   Patient presents with    Leg Pain     right       Is someone accompanying this pt? no    Is the patient using any DME equipment during OV? no    Depression Screening:  3 most recent PHQ Screens 1/21/2021   PHQ Not Done -   Little interest or pleasure in doing things Not at all   Feeling down, depressed, irritable, or hopeless Not at all   Total Score PHQ 2 0       Learning Assessment:  Learning Assessment 3/15/2019   PRIMARY LEARNER Patient   PRIMARY LANGUAGE ENGLISH   LEARNER PREFERENCE PRIMARY DEMONSTRATION   ANSWERED BY Patient   RELATIONSHIP SELF       Abuse Screening:  No flowsheet data found. Fall Risk  No flowsheet data found. OPIOID RISK TOOL  No flowsheet data found. Coordination of Care:  1. Have you been to the ER, urgent care clinic since your last visit? no  Hospitalized since your last visit? no    2. Have you seen or consulted any other health care providers outside of the [address] [place] [address] since your last visit? no Include any pap smears or colon screening.  no

## 2022-05-16 NOTE — Clinical Note
NOTIFICATION RETURN TO WORK / SCHOOL    5/16/2022 2:06 PM    Ms. Vivar [address]  [address] [facility]      To Whom It May Concern:    Emma Nava is currently under the care of [address] [facility]. She will return to work/school on: ***    If there are questions or concerns please have the patient contact our office.         Sincerely,      Janiya Mendosa MD

## 2023-03-30 ENCOUNTER — OFFICE VISIT (OUTPATIENT)
Age: 48
End: 2023-03-30

## 2023-03-30 VITALS
HEART RATE: 69 BPM | TEMPERATURE: 97.2 F | OXYGEN SATURATION: 100 % | HEIGHT: 62 IN | BODY MASS INDEX: 37.36 KG/M2 | WEIGHT: 203 LBS

## 2023-03-30 DIAGNOSIS — M54.16 RADICULOPATHY, LUMBAR REGION: ICD-10-CM

## 2023-03-30 DIAGNOSIS — M51.26 OTHER INTERVERTEBRAL DISC DISPLACEMENT, LUMBAR REGION: Primary | ICD-10-CM

## 2023-03-30 DIAGNOSIS — R29.898 OTHER SYMPTOMS AND SIGNS INVOLVING THE MUSCULOSKELETAL SYSTEM: ICD-10-CM

## 2023-03-30 DIAGNOSIS — R29.898 RIGHT LEG WEAKNESS: ICD-10-CM

## 2023-03-30 RX ORDER — METHOCARBAMOL 500 MG/1
500 TABLET, FILM COATED ORAL DAILY PRN
Qty: 30 TABLET | Refills: 5 | Status: SHIPPED | OUTPATIENT
Start: 2023-03-30

## 2023-03-30 RX ORDER — TOPIRAMATE 25 MG/1
TABLET ORAL
Qty: 60 TABLET | Refills: 5 | Status: SHIPPED | OUTPATIENT
Start: 2023-03-30

## 2023-03-30 NOTE — PROGRESS NOTES
Eder Lee presents today for   Chief Complaint   Patient presents with    Leg Pain     Right       Back Pain     Lower         Is someone accompanying this pt? no    Is the patient using any DME equipment during OV? no        Coordination of Care:  1. Have you been to the ER, urgent care clinic since your last visit? no  Hospitalized since your last visit? no    2. Have you seen or consulted any other health care providers outside of the [address] [place] [address] since your last visit? no Include any pap smears or colon screening.  no
region    Radiculopathy, lumbar region    Other symptoms and signs involving the musculoskeletal system  -     topiramate (TOPAMAX) 25 MG tablet; Take 1 tab qd x 1 week then 2 tabs qd  -     Mar Centeno MD, Neurology, [place]  -     methocarbamol (ROBAXIN) 500 MG tablet; Take 1 tablet by mouth daily as needed (pain)    Right leg weakness  -     Mar Centeno MD, Neurology, [facility]      Return in about 6 months (around 9/30/2023) for fu with NP Zonia.  has been . reviewed and is appropriate    Medications:  Current Outpatient Medications   Medication Sig Dispense Refill    topiramate (TOPAMAX) 25 MG tablet Take 1 tab qd x 1 week then 2 tabs qd 60 tablet 5    methocarbamol (ROBAXIN) 500 MG tablet Take 1 tablet by mouth daily as needed (pain) 30 tablet 5    amLODIPine (NORVASC) 10 MG tablet Take 10 mg by mouth daily      vitamin D (CHOLECALCIFEROL) 56653 UNIT CAPS cholecalciferol (vitamin D3) 1,250 mcg (50,000 unit) capsule   TAKE 1 CAPSULE BY MOUTH ONCE A WEEK      hydroCHLOROthiazide (HYDRODIURIL) 12.5 MG tablet hydrochlorothiazide 12.5 mg tablet   TAKE 1 TABLET BY MOUTH ONCE DAILY      ibuprofen (ADVIL;MOTRIN) 800 MG tablet Take 800 mg by mouth 2 times daily as needed      losartan (COZAAR) 50 MG tablet Take 50 mg by mouth daily      montelukast (SINGULAIR) 10 MG tablet Take 10 mg by mouth daily as needed      rosuvastatin (CRESTOR) 20 MG tablet rosuvastatin 20 mg tablet   TAKE 1 TABLET BY MOUTH ONCE DAILY      Semaglutide,0.25 or 0.5MG/DOS, (OZEMPIC, 0.25 OR 0.5 MG/DOSE,) 2 MG/1.5ML SOPN INJECT 0.5MG SUBCUTANEOUS WEEKLY      topiramate (TOPAMAX) 50 MG tablet Take 50 mg by mouth daily as needed      azelastine (ASTELIN) 0.1 % nasal spray 1 puff in each nostril      azithromycin (ZITHROMAX) 250 MG tablet USE AS DIRECTED ON PACKAGE      fluconazole (DIFLUCAN) 150 MG tablet TAKE 1 TABLET BY MOUTH AS A ONE TIME DOSE CAN TAKE ANOTHER PILL 72 HOURS LATER IF SYMPTOMS PERSIST
WDL

## 2023-05-19 ENCOUNTER — OFFICE VISIT (OUTPATIENT)
Age: 48
End: 2023-05-19
Payer: MEDICAID

## 2023-05-19 VITALS
WEIGHT: 204 LBS | BODY MASS INDEX: 37.54 KG/M2 | OXYGEN SATURATION: 99 % | HEIGHT: 62 IN | TEMPERATURE: 97.1 F | HEART RATE: 64 BPM

## 2023-05-19 DIAGNOSIS — R29.898 RIGHT LEG WEAKNESS: Primary | ICD-10-CM

## 2023-05-19 PROCEDURE — 99212 OFFICE O/P EST SF 10 MIN: CPT | Performed by: NURSE PRACTITIONER

## 2023-05-19 RX ORDER — ERGOCALCIFEROL 1.25 MG/1
CAPSULE ORAL
COMMUNITY
Start: 2023-05-12

## 2023-05-19 RX ORDER — FLUCONAZOLE 150 MG/1
TABLET ORAL
COMMUNITY
Start: 2023-05-04

## 2023-05-19 NOTE — PROGRESS NOTES
Chief complaint   Chief Complaint   Patient presents with    Leg Pain     Right          History of Present Illness:  Asha Kirkland is a  41 y.o.  female who comes in today with continuing right lower extremity pain into the anterior thigh. She states it seems to be getting worse and that she never did hear from the neurology practice that we referred her to. She has had a EMG of the right lower extremity which was normal and MRI of the lumbar spine with no focal impingement. She is on Topamax and Robaxin. She denies fever bowel bladder dysfunction. Physical Exam: The patient is a 42-year-old female well-developed well-nourished who is alert and oriented with a normal mood and affect. She has a full weightbearing nonantalgic gait. She did not use any assist device. She has 5 out of 5 strength of her left lower extremity and 4 out of 5 on the right. Assessment and Plan: This is a patient has right leg pain and some weakness. We called the neurology office and got an appointment for September 18 with Dr. Nellie Mckeon. We will see her back as needed. Soraida Iglesias was seen today for leg pain. Diagnoses and all orders for this visit:    Right leg weakness    hoNo follow-ups on file.  has been . reviewed and is appropriate    Medications:  Current Outpatient Medications   Medication Sig Dispense Refill    fluconazole (DIFLUCAN) 150 MG tablet TAKE 1 TABLET BY MOUTH TODAY AND THEN REPEAT IN 3 DAYS      vitamin D (ERGOCALCIFEROL) 1.25 MG (78176 UT) CAPS capsule TAKE 1 CAPSULE BY MOUTH THREE TIMES A WEEK FOR 12 WEEKS, THEN ONE CAPSULE ONCE A WEEK THEREAFTER      topiramate (TOPAMAX) 25 MG tablet Take 1 tab qd x 1 week then 2 tabs qd 60 tablet 5    methocarbamol (ROBAXIN) 500 MG tablet Take 1 tablet by mouth daily as needed (pain) 30 tablet 5    amLODIPine (NORVASC) 10 MG tablet Take 1 tablet by mouth daily      hydroCHLOROthiazide (HYDRODIURIL) 12.5 MG tablet hydrochlorothiazide 12.5 mg tablet   TAKE

## 2023-05-19 NOTE — PROGRESS NOTES
Alice Enrique presents today for   Chief Complaint   Patient presents with    Leg Pain     Right          Is someone accompanying this pt? no    Is the patient using any DME equipment during OV? no      Coordination of Care:  1. Have you been to the ER, urgent care clinic since your last visit? no  Hospitalized since your last visit? no    2. Have you seen or consulted any other health care providers outside of the [address] [place] [address] since your last visit? Yes, endocrinology Include any pap smears or colon screening.  no

## 2023-10-05 ENCOUNTER — OFFICE VISIT (OUTPATIENT)
Age: 48
End: 2023-10-05
Payer: MEDICAID

## 2023-10-05 VITALS
OXYGEN SATURATION: 98 % | HEART RATE: 86 BPM | WEIGHT: 207.6 LBS | TEMPERATURE: 98.1 F | SYSTOLIC BLOOD PRESSURE: 100 MMHG | DIASTOLIC BLOOD PRESSURE: 60 MMHG | HEIGHT: 62 IN | BODY MASS INDEX: 38.2 KG/M2 | RESPIRATION RATE: 20 BRPM

## 2023-10-05 DIAGNOSIS — G57.11 MERALGIA PARESTHETICA OF RIGHT SIDE: Primary | ICD-10-CM

## 2023-10-05 DIAGNOSIS — M79.651 RIGHT THIGH PAIN: ICD-10-CM

## 2023-10-05 PROCEDURE — 3074F SYST BP LT 130 MM HG: CPT | Performed by: STUDENT IN AN ORGANIZED HEALTH CARE EDUCATION/TRAINING PROGRAM

## 2023-10-05 PROCEDURE — 99204 OFFICE O/P NEW MOD 45 MIN: CPT | Performed by: STUDENT IN AN ORGANIZED HEALTH CARE EDUCATION/TRAINING PROGRAM

## 2023-10-05 PROCEDURE — 3078F DIAST BP <80 MM HG: CPT | Performed by: STUDENT IN AN ORGANIZED HEALTH CARE EDUCATION/TRAINING PROGRAM

## 2023-10-05 ASSESSMENT — ENCOUNTER SYMPTOMS
VOMITING: 0
SHORTNESS OF BREATH: 0
NAUSEA: 0
COUGH: 0
BACK PAIN: 1

## 2023-10-05 NOTE — PROGRESS NOTES
Selena Garcia is a 57 y.o. female . presents for No chief complaint on file. A 57years old female patient with medical history of hypertension, hyperlipidemia, diabetes, and morbid obesity referred here for evaluation of right lower extremity pain of more than 2 years duration. Symptoms are stable; not progressing. It is over the anterior thigh. When she is walking for longer distances, develops sharp shooting pain over the thigh. Has numbness in the same distribution when lying down. No tingling/burning sensation. Her leg might give out if she walks longer distances. After walking for some distance, might need to sit down. No symptoms over the left lower extremity. No upper extremity weakness. No symptoms below the knee. No history of trauma. She has occasional lower back pain when walking; numbness radiating. For the thigh pain, she takes ibuprofen and Robaxin as needed; helps sometimes. Had an MRI of the lumbosacral spine from December 2020 which showed L3-4 small posterior disc protrusion with no focal neural impingement; mild central canal narrowing. L5/S1 central disc protrusion without central canal stenosis or nerve root impingement. Had an EMG of the lower extremities from May 2022 was normal.  She currently works at [employer]. Review of Systems   Constitutional:  Negative for chills, fever and unexpected weight change. HENT:  Negative for hearing loss and tinnitus. Eyes:  Negative for visual disturbance. Respiratory:  Negative for cough and shortness of breath. Cardiovascular:  Negative for chest pain and leg swelling. Gastrointestinal:  Negative for nausea and vomiting. Genitourinary:  Negative for dysuria, frequency and urgency. Musculoskeletal:  Positive for arthralgias (occasionally) and back pain. Negative for neck pain. Skin:  Negative for rash. Neurological:  Positive for weakness (RLE when walking) and numbness (right anterior thigh).